# Patient Record
Sex: FEMALE | Race: WHITE | NOT HISPANIC OR LATINO | Employment: OTHER | URBAN - METROPOLITAN AREA
[De-identification: names, ages, dates, MRNs, and addresses within clinical notes are randomized per-mention and may not be internally consistent; named-entity substitution may affect disease eponyms.]

---

## 2018-08-01 ENCOUNTER — HOSPITAL ENCOUNTER (OUTPATIENT)
Dept: LAB | Facility: HOSPITAL | Age: 73
Discharge: HOME | End: 2018-08-01
Attending: FAMILY MEDICINE | Admitting: FAMILY MEDICINE

## 2018-08-01 LAB
ALBUMIN SERPL-MCNC: 3.9 G/DL (ref 3.5–4.8)
ALBUMIN/GLOB SERPL: 1.5 {RATIO} (ref 1–1.7)
ALP SERPL-CCNC: 77 IU/L (ref 32–91)
ALT SERPL-CCNC: 19 IU/L (ref 14–54)
ANION GAP SERPL CALC-SCNC: 11.5 MMOL/L (ref 10–20)
AST SERPL-CCNC: 24 IU/L (ref 15–41)
BASOPHILS # BLD AUTO: 0 10*3/UL (ref 0–0.2)
BASOPHILS NFR BLD AUTO: 1 % (ref 0–2)
BILIRUB SERPL-MCNC: 0.7 MG/DL (ref 0.3–1.2)
BUN SERPL-MCNC: 9 MG/DL (ref 8–20)
BUN/CREAT SERPL: 9 (ref 5.4–26.2)
CALCIUM SERPL-MCNC: 9.4 MG/DL (ref 8.9–10.3)
CHLORIDE SERPL-SCNC: 104 MMOL/L (ref 101–111)
CHOLEST SERPL-MCNC: 193 MG/DL
CHOLEST/HDLC SERPL: 2.1 {RATIO}
CONV CO2: 30 MMOL/L (ref 22–32)
CONV LDL CHOLESTEROL DIRECT: 82 MG/DL (ref 0–100)
CONV TOTAL PROTEIN: 6.5 G/DL (ref 6.1–7.9)
CREAT UR-MCNC: 1 MG/DL (ref 0.4–1)
DIFFERENTIAL METHOD BLD: (no result)
EOSINOPHIL # BLD AUTO: 0.2 10*3/UL (ref 0–0.3)
EOSINOPHIL # BLD AUTO: 4 % (ref 0–3)
ERYTHROCYTE [DISTWIDTH] IN BLOOD BY AUTOMATED COUNT: 13.8 % (ref 11.5–14.5)
GLOBULIN UR ELPH-MCNC: 2.6 G/DL (ref 2.5–3.8)
GLUCOSE SERPL-MCNC: 94 MG/DL (ref 65–99)
HCT VFR BLD AUTO: 45.1 % (ref 35–49)
HDLC SERPL-MCNC: 92 MG/DL
HGB BLD-MCNC: 14.8 G/DL (ref 12–15)
LDLC/HDLC SERPL: 0.9 {RATIO}
LIPID INTERPRETATION: NORMAL
LYMPHOCYTES # BLD AUTO: 1.8 10*3/UL (ref 0.8–4.8)
LYMPHOCYTES NFR BLD AUTO: 36 % (ref 18–42)
MCH RBC QN AUTO: 32 PG (ref 26–32)
MCHC RBC AUTO-ENTMCNC: 32.9 G/DL (ref 32–36)
MCV RBC AUTO: 97.1 FL (ref 80–94)
MONOCYTES # BLD AUTO: 0.4 10*3/UL (ref 0.1–1.3)
MONOCYTES NFR BLD AUTO: 7 % (ref 2–11)
NEUTROPHILS # BLD AUTO: 2.7 10*3/UL (ref 2.3–8.6)
NEUTROPHILS NFR BLD AUTO: 52 % (ref 50–75)
NRBC BLD AUTO-RTO: 0 /100{WBCS}
NRBC/RBC NFR BLD MANUAL: 0 10*3/UL
PLATELET # BLD AUTO: 258 10*3/UL (ref 150–450)
PMV BLD AUTO: 8.4 FL (ref 7.4–10.4)
POTASSIUM SERPL-SCNC: 4.5 MMOL/L (ref 3.6–5.1)
RBC # BLD AUTO: 4.64 10*6/UL (ref 4–5.4)
SODIUM SERPL-SCNC: 141 MMOL/L (ref 136–144)
TRIGL SERPL-MCNC: 48 MG/DL
VLDLC SERPL CALC-MCNC: 19.4 MG/DL
WBC # BLD AUTO: 5.1 10*3/UL (ref 4.5–11.5)

## 2018-08-15 ENCOUNTER — APPOINTMENT (OUTPATIENT)
Dept: WOMENS IMAGING | Facility: HOSPITAL | Age: 73
End: 2018-08-15

## 2018-08-15 PROCEDURE — MDREVIEWSP: Performed by: RADIOLOGY

## 2018-08-15 PROCEDURE — 77067 SCR MAMMO BI INCL CAD: CPT | Performed by: RADIOLOGY

## 2018-08-15 PROCEDURE — 77063 BREAST TOMOSYNTHESIS BI: CPT | Performed by: RADIOLOGY

## 2020-01-13 ENCOUNTER — APPOINTMENT (OUTPATIENT)
Dept: GENERAL RADIOLOGY | Facility: HOSPITAL | Age: 75
End: 2020-01-13

## 2020-01-13 ENCOUNTER — APPOINTMENT (OUTPATIENT)
Dept: CARDIOLOGY | Facility: HOSPITAL | Age: 75
End: 2020-01-13

## 2020-01-13 ENCOUNTER — HOSPITAL ENCOUNTER (OUTPATIENT)
Facility: HOSPITAL | Age: 75
Setting detail: OBSERVATION
Discharge: HOME | End: 2020-01-14
Attending: INTERNAL MEDICINE | Admitting: INTERNAL MEDICINE

## 2020-01-13 PROBLEM — R55 NEAR SYNCOPE: Status: ACTIVE | Noted: 2020-01-13

## 2020-01-13 LAB
ALBUMIN SERPL-MCNC: 4.2 G/DL (ref 3.5–5.2)
ALBUMIN/GLOB SERPL: 1.5 G/DL
ALP SERPL-CCNC: 86 U/L (ref 39–117)
ALT SERPL W P-5'-P-CCNC: 13 U/L (ref 1–33)
ANION GAP SERPL CALCULATED.3IONS-SCNC: 10 MMOL/L (ref 5–15)
AST SERPL-CCNC: 20 U/L (ref 1–32)
BASOPHILS # BLD AUTO: 0 10*3/MM3 (ref 0–0.2)
BASOPHILS NFR BLD AUTO: 0.6 % (ref 0–1.5)
BH CV ECHO MEAS - % IVS THICK: 41.5 %
BH CV ECHO MEAS - % LVPW THICK: 73.3 %
BH CV ECHO MEAS - ACS: 1.6 CM
BH CV ECHO MEAS - AO MAX PG (FULL): 0.43 MMHG
BH CV ECHO MEAS - AO MAX PG: 3.6 MMHG
BH CV ECHO MEAS - AO MEAN PG (FULL): 0.2 MMHG
BH CV ECHO MEAS - AO MEAN PG: 2.1 MMHG
BH CV ECHO MEAS - AO ROOT AREA (BSA CORRECTED): 1.6
BH CV ECHO MEAS - AO ROOT AREA: 5.7 CM^2
BH CV ECHO MEAS - AO ROOT DIAM: 2.7 CM
BH CV ECHO MEAS - AO V2 MAX: 94.7 CM/SEC
BH CV ECHO MEAS - AO V2 MEAN: 69 CM/SEC
BH CV ECHO MEAS - AO V2 VTI: 17 CM
BH CV ECHO MEAS - AVA(I,A): 2.7 CM^2
BH CV ECHO MEAS - AVA(I,D): 2.7 CM^2
BH CV ECHO MEAS - AVA(V,A): 2.3 CM^2
BH CV ECHO MEAS - AVA(V,D): 2.3 CM^2
BH CV ECHO MEAS - BSA(HAYCOCK): 1.7 M^2
BH CV ECHO MEAS - BSA: 1.7 M^2
BH CV ECHO MEAS - BZI_BMI: 21.8 KILOGRAMS/M^2
BH CV ECHO MEAS - BZI_METRIC_HEIGHT: 165.1 CM
BH CV ECHO MEAS - BZI_METRIC_WEIGHT: 59.4 KG
BH CV ECHO MEAS - EDV(CUBED): 51.9 ML
BH CV ECHO MEAS - EDV(MOD-SP2): 37.4 ML
BH CV ECHO MEAS - EDV(MOD-SP4): 42.1 ML
BH CV ECHO MEAS - EDV(TEICH): 59.2 ML
BH CV ECHO MEAS - EF(CUBED): 63.4 %
BH CV ECHO MEAS - EF(MOD-BP): 67 %
BH CV ECHO MEAS - EF(MOD-SP2): 72.7 %
BH CV ECHO MEAS - EF(MOD-SP4): 61.4 %
BH CV ECHO MEAS - EF(TEICH): 55.7 %
BH CV ECHO MEAS - ESV(CUBED): 19 ML
BH CV ECHO MEAS - ESV(MOD-SP2): 10.2 ML
BH CV ECHO MEAS - ESV(MOD-SP4): 16.2 ML
BH CV ECHO MEAS - ESV(TEICH): 26.2 ML
BH CV ECHO MEAS - FS: 28.5 %
BH CV ECHO MEAS - IVS/LVPW: 1
BH CV ECHO MEAS - IVSD: 0.74 CM
BH CV ECHO MEAS - IVSS: 1 CM
BH CV ECHO MEAS - LA DIMENSION(2D): 2.8 CM
BH CV ECHO MEAS - LV DIASTOLIC VOL/BSA (35-75): 25.4 ML/M^2
BH CV ECHO MEAS - LV MASS(C)D: 74.5 GRAMS
BH CV ECHO MEAS - LV MASS(C)DI: 45.1 GRAMS/M^2
BH CV ECHO MEAS - LV MASS(C)S: 87.8 GRAMS
BH CV ECHO MEAS - LV MASS(C)SI: 53.1 GRAMS/M^2
BH CV ECHO MEAS - LV MAX PG: 3.2 MMHG
BH CV ECHO MEAS - LV MEAN PG: 1.9 MMHG
BH CV ECHO MEAS - LV SYSTOLIC VOL/BSA (12-30): 9.8 ML/M^2
BH CV ECHO MEAS - LV V1 MAX: 88.9 CM/SEC
BH CV ECHO MEAS - LV V1 MEAN: 65.8 CM/SEC
BH CV ECHO MEAS - LV V1 VTI: 18.5 CM
BH CV ECHO MEAS - LVIDD: 3.7 CM
BH CV ECHO MEAS - LVIDS: 2.7 CM
BH CV ECHO MEAS - LVOT AREA: 2.4 CM^2
BH CV ECHO MEAS - LVOT DIAM: 1.8 CM
BH CV ECHO MEAS - LVPWD: 0.74 CM
BH CV ECHO MEAS - LVPWS: 1.3 CM
BH CV ECHO MEAS - MV A MAX VEL: 86.9 CM/SEC
BH CV ECHO MEAS - MV DEC SLOPE: 225.3 CM/SEC^2
BH CV ECHO MEAS - MV DEC TIME: 0.22 SEC
BH CV ECHO MEAS - MV E MAX VEL: 50.2 CM/SEC
BH CV ECHO MEAS - MV E/A: 0.58
BH CV ECHO MEAS - MV MAX PG: 2.6 MMHG
BH CV ECHO MEAS - MV MEAN PG: 1.1 MMHG
BH CV ECHO MEAS - MV V2 MAX: 80.4 CM/SEC
BH CV ECHO MEAS - MV V2 MEAN: 51.6 CM/SEC
BH CV ECHO MEAS - MV V2 VTI: 18 CM
BH CV ECHO MEAS - MVA(VTI): 2.5 CM^2
BH CV ECHO MEAS - PA ACC TIME: 0.08 SEC
BH CV ECHO MEAS - PA MAX PG (FULL): 1.2 MMHG
BH CV ECHO MEAS - PA MAX PG: 3 MMHG
BH CV ECHO MEAS - PA MEAN PG (FULL): 0.83 MMHG
BH CV ECHO MEAS - PA MEAN PG: 1.8 MMHG
BH CV ECHO MEAS - PA PR(ACCEL): 41.6 MMHG
BH CV ECHO MEAS - PA V2 MAX: 87 CM/SEC
BH CV ECHO MEAS - PA V2 MEAN: 63.7 CM/SEC
BH CV ECHO MEAS - PA V2 VTI: 18.3 CM
BH CV ECHO MEAS - PULM A REVS DUR: 0.08 SEC
BH CV ECHO MEAS - PULM A REVS VEL: 41.8 CM/SEC
BH CV ECHO MEAS - PULM DIAS VEL: 33 CM/SEC
BH CV ECHO MEAS - PULM S/D: 1.9
BH CV ECHO MEAS - PULM SYS VEL: 62.5 CM/SEC
BH CV ECHO MEAS - RAP SYSTOLE: 3 MMHG
BH CV ECHO MEAS - RV MAX PG: 1.8 MMHG
BH CV ECHO MEAS - RV MEAN PG: 0.92 MMHG
BH CV ECHO MEAS - RV V1 MAX: 66.7 CM/SEC
BH CV ECHO MEAS - RV V1 MEAN: 45 CM/SEC
BH CV ECHO MEAS - RV V1 VTI: 12.9 CM
BH CV ECHO MEAS - RVDD: 2.7 CM
BH CV ECHO MEAS - RVSP: 3.1 MMHG
BH CV ECHO MEAS - SI(AO): 58.9 ML/M^2
BH CV ECHO MEAS - SI(CUBED): 19.9 ML/M^2
BH CV ECHO MEAS - SI(LVOT): 27.2 ML/M^2
BH CV ECHO MEAS - SI(MOD-SP2): 16.4 ML/M^2
BH CV ECHO MEAS - SI(MOD-SP4): 15.6 ML/M^2
BH CV ECHO MEAS - SI(TEICH): 20 ML/M^2
BH CV ECHO MEAS - SV(AO): 97.4 ML
BH CV ECHO MEAS - SV(CUBED): 32.9 ML
BH CV ECHO MEAS - SV(LVOT): 45 ML
BH CV ECHO MEAS - SV(MOD-SP2): 27.2 ML
BH CV ECHO MEAS - SV(MOD-SP4): 25.8 ML
BH CV ECHO MEAS - SV(TEICH): 33 ML
BH CV ECHO MEAS - TR MAX VEL: 12.6 CM/SEC
BILIRUB SERPL-MCNC: 0.4 MG/DL (ref 0.2–1.2)
BUN BLD-MCNC: 10 MG/DL (ref 8–23)
BUN/CREAT SERPL: 12.5 (ref 7–25)
CALCIUM SPEC-SCNC: 9.8 MG/DL (ref 8.6–10.5)
CHLORIDE SERPL-SCNC: 102 MMOL/L (ref 98–107)
CO2 SERPL-SCNC: 29 MMOL/L (ref 22–29)
CREAT BLD-MCNC: 0.8 MG/DL (ref 0.57–1)
D DIMER PPP FEU-MCNC: 0.69 MCGFEU/ML (ref 0.17–0.59)
DEPRECATED RDW RBC AUTO: 49.4 FL (ref 37–54)
EOSINOPHIL # BLD AUTO: 0 10*3/MM3 (ref 0–0.4)
EOSINOPHIL NFR BLD AUTO: 0.3 % (ref 0.3–6.2)
ERYTHROCYTE [DISTWIDTH] IN BLOOD BY AUTOMATED COUNT: 15.2 % (ref 12.3–15.4)
GFR SERPL CREATININE-BSD FRML MDRD: 70 ML/MIN/1.73
GLOBULIN UR ELPH-MCNC: 2.8 GM/DL
GLUCOSE BLD-MCNC: 104 MG/DL (ref 65–99)
HCT VFR BLD AUTO: 42.3 % (ref 34–46.6)
HGB BLD-MCNC: 14.2 G/DL (ref 12–15.9)
LYMPHOCYTES # BLD AUTO: 1.8 10*3/MM3 (ref 0.7–3.1)
LYMPHOCYTES NFR BLD AUTO: 27.2 % (ref 19.6–45.3)
MAGNESIUM SERPL-MCNC: 2.1 MG/DL (ref 1.6–2.4)
MCH RBC QN AUTO: 31.5 PG (ref 26.6–33)
MCHC RBC AUTO-ENTMCNC: 33.6 G/DL (ref 31.5–35.7)
MCV RBC AUTO: 93.7 FL (ref 79–97)
MONOCYTES # BLD AUTO: 0.4 10*3/MM3 (ref 0.1–0.9)
MONOCYTES NFR BLD AUTO: 5.6 % (ref 5–12)
NEUTROPHILS # BLD AUTO: 4.3 10*3/MM3 (ref 1.7–7)
NEUTROPHILS NFR BLD AUTO: 66.3 % (ref 42.7–76)
NRBC BLD AUTO-RTO: 0 /100 WBC (ref 0–0.2)
NT-PROBNP SERPL-MCNC: 650.6 PG/ML (ref 5–900)
PLATELET # BLD AUTO: 277 10*3/MM3 (ref 140–450)
PMV BLD AUTO: 7.3 FL (ref 6–12)
POTASSIUM BLD-SCNC: 4.1 MMOL/L (ref 3.5–5.2)
PROT SERPL-MCNC: 7 G/DL (ref 6–8.5)
RBC # BLD AUTO: 4.51 10*6/MM3 (ref 3.77–5.28)
SODIUM BLD-SCNC: 141 MMOL/L (ref 136–145)
T4 FREE SERPL-MCNC: 1.24 NG/DL (ref 0.93–1.7)
TROPONIN T SERPL-MCNC: <0.01 NG/ML (ref 0–0.03)
TSH SERPL DL<=0.05 MIU/L-ACNC: 2.5 UIU/ML (ref 0.27–4.2)
WBC NRBC COR # BLD: 6.5 10*3/MM3 (ref 3.4–10.8)

## 2020-01-13 PROCEDURE — 93306 TTE W/DOPPLER COMPLETE: CPT | Performed by: INTERNAL MEDICINE

## 2020-01-13 PROCEDURE — 71046 X-RAY EXAM CHEST 2 VIEWS: CPT

## 2020-01-13 PROCEDURE — 84443 ASSAY THYROID STIM HORMONE: CPT | Performed by: INTERNAL MEDICINE

## 2020-01-13 PROCEDURE — 83735 ASSAY OF MAGNESIUM: CPT | Performed by: INTERNAL MEDICINE

## 2020-01-13 PROCEDURE — G0378 HOSPITAL OBSERVATION PER HR: HCPCS

## 2020-01-13 PROCEDURE — 93306 TTE W/DOPPLER COMPLETE: CPT

## 2020-01-13 PROCEDURE — 85379 FIBRIN DEGRADATION QUANT: CPT | Performed by: NURSE PRACTITIONER

## 2020-01-13 PROCEDURE — 85025 COMPLETE CBC W/AUTO DIFF WBC: CPT | Performed by: INTERNAL MEDICINE

## 2020-01-13 PROCEDURE — 93005 ELECTROCARDIOGRAM TRACING: CPT | Performed by: NURSE PRACTITIONER

## 2020-01-13 PROCEDURE — 99214 OFFICE O/P EST MOD 30 MIN: CPT | Performed by: INTERNAL MEDICINE

## 2020-01-13 PROCEDURE — 84439 ASSAY OF FREE THYROXINE: CPT | Performed by: INTERNAL MEDICINE

## 2020-01-13 PROCEDURE — 84484 ASSAY OF TROPONIN QUANT: CPT | Performed by: INTERNAL MEDICINE

## 2020-01-13 PROCEDURE — 84484 ASSAY OF TROPONIN QUANT: CPT | Performed by: NURSE PRACTITIONER

## 2020-01-13 PROCEDURE — 83880 ASSAY OF NATRIURETIC PEPTIDE: CPT | Performed by: INTERNAL MEDICINE

## 2020-01-13 PROCEDURE — G0379 DIRECT REFER HOSPITAL OBSERV: HCPCS

## 2020-01-13 PROCEDURE — 80053 COMPREHEN METABOLIC PANEL: CPT | Performed by: INTERNAL MEDICINE

## 2020-01-13 RX ORDER — SODIUM CHLORIDE 0.9 % (FLUSH) 0.9 %
10 SYRINGE (ML) INJECTION EVERY 12 HOURS SCHEDULED
Status: DISCONTINUED | OUTPATIENT
Start: 2020-01-13 | End: 2020-01-14 | Stop reason: HOSPADM

## 2020-01-13 RX ORDER — ATORVASTATIN CALCIUM 20 MG/1
20 TABLET, FILM COATED ORAL DAILY
Status: DISCONTINUED | OUTPATIENT
Start: 2020-01-14 | End: 2020-01-14 | Stop reason: HOSPADM

## 2020-01-13 RX ORDER — CHLORAL HYDRATE 500 MG
600 CAPSULE ORAL 2 TIMES DAILY WITH MEALS
COMMUNITY

## 2020-01-13 RX ORDER — ONDANSETRON 2 MG/ML
4 INJECTION INTRAMUSCULAR; INTRAVENOUS EVERY 6 HOURS PRN
Status: DISCONTINUED | OUTPATIENT
Start: 2020-01-13 | End: 2020-01-14 | Stop reason: HOSPADM

## 2020-01-13 RX ORDER — ACETAMINOPHEN 160 MG/5ML
650 SOLUTION ORAL EVERY 4 HOURS PRN
Status: DISCONTINUED | OUTPATIENT
Start: 2020-01-13 | End: 2020-01-14 | Stop reason: HOSPADM

## 2020-01-13 RX ORDER — ACETAMINOPHEN 325 MG/1
650 TABLET ORAL EVERY 4 HOURS PRN
Status: DISCONTINUED | OUTPATIENT
Start: 2020-01-13 | End: 2020-01-14 | Stop reason: HOSPADM

## 2020-01-13 RX ORDER — ATORVASTATIN CALCIUM 20 MG/1
20 TABLET, FILM COATED ORAL DAILY
COMMUNITY
End: 2023-03-13

## 2020-01-13 RX ORDER — ACETAMINOPHEN 650 MG/1
650 SUPPOSITORY RECTAL EVERY 4 HOURS PRN
Status: DISCONTINUED | OUTPATIENT
Start: 2020-01-13 | End: 2020-01-14 | Stop reason: HOSPADM

## 2020-01-13 RX ORDER — PANTOPRAZOLE SODIUM 40 MG/1
40 TABLET, DELAYED RELEASE ORAL
Status: DISCONTINUED | OUTPATIENT
Start: 2020-01-14 | End: 2020-01-14 | Stop reason: HOSPADM

## 2020-01-13 RX ORDER — SODIUM CHLORIDE 0.9 % (FLUSH) 0.9 %
10 SYRINGE (ML) INJECTION AS NEEDED
Status: DISCONTINUED | OUTPATIENT
Start: 2020-01-13 | End: 2020-01-14 | Stop reason: HOSPADM

## 2020-01-13 RX ORDER — ONDANSETRON 4 MG/1
4 TABLET, FILM COATED ORAL EVERY 6 HOURS PRN
Status: DISCONTINUED | OUTPATIENT
Start: 2020-01-13 | End: 2020-01-14 | Stop reason: HOSPADM

## 2020-01-13 RX ORDER — NITROGLYCERIN 0.4 MG/1
0.4 TABLET SUBLINGUAL
Status: DISCONTINUED | OUTPATIENT
Start: 2020-01-13 | End: 2020-01-14 | Stop reason: HOSPADM

## 2020-01-13 RX ADMIN — Medication 10 ML: at 20:15

## 2020-01-13 NOTE — H&P
"    Patient Care Team:  Elizabeth Nazario MD as PCP - General (Family Medicine)    Chief complaint episode of dizziness with racing heart beat  Subjective     Patient is a 74 y.o. female presents with sudden onset yesterday of feeling like her heart was racing with shortness of breath and lightheadedness.  She was grocery shopping when it started.  In retrospect, she has experienced brief (seconds) of palpitations intermittently x a few months unrelated to exertion.  She denied any chest pain but described an \"uncomfortable\" feeling in her chest.  Heart rate by her estimate (via stethoscope) was 160.  Her radial pulse was very weak.   She took 1/2 of her 's Ativan, thinking she might be having a panic attack, which lowered her heart rate to 130's.  She fell asleep.  When she woke her heart rate was 70-80's.  She has no known cardiac history and is very active and functional. She drinks one cup of coffee per day and denies any other stimulants.  She does not smoke.    Review of Systems   Constitutional: Negative for activity change, appetite change, chills, diaphoresis, fatigue and fever.   HENT: Negative for facial swelling.    Eyes: Negative for visual disturbance.   Respiratory: Positive for chest tightness and shortness of breath. Negative for cough and choking.    Cardiovascular: Positive for palpitations. Negative for chest pain and leg swelling.   Gastrointestinal: Negative for abdominal pain, diarrhea, nausea and vomiting.   Genitourinary: Negative for urgency.   Musculoskeletal: Negative for myalgias.   Neurological: Negative for tremors, syncope and weakness.   Psychiatric/Behavioral: Negative for behavioral problems and confusion.          History  Past Medical History:   Diagnosis Date   • Hyperlipidemia      History reviewed. No pertinent surgical history.  History reviewed. No pertinent family history.  Social History     Tobacco Use   • Smoking status: Never Smoker   • Smokeless tobacco: Never " Used   Substance Use Topics   • Alcohol use: Yes     Alcohol/week: 7.0 standard drinks     Types: 7 Shots of liquor per week   • Drug use: Never     Medications Prior to Admission   Medication Sig Dispense Refill Last Dose   • atorvastatin (LIPITOR) 20 MG tablet Take 20 mg by mouth Daily.   1/13/2020 at Unknown time   • calcium citrate-vitamin d (CITRACAL) 200-250 MG-UNIT tablet tablet Take 1 tablet by mouth 2 (Two) Times a Day.   1/13/2020 at Unknown time   • Omega-3 Fatty Acids (FISH OIL) 1000 MG capsule capsule Take 600 mg by mouth 2 (Two) Times a Day With Meals.        Allergies:  Patient has no known allergies.    Objective     Vital Signs  Temp:  [97.7 °F (36.5 °C)-98.9 °F (37.2 °C)] 98.9 °F (37.2 °C)  Heart Rate:  [80-86] 80  Resp:  [14] 14  BP: (131-138)/(81-84) 131/81     Physical Exam:      General Appearance:    Alert, cooperative, in no acute distress   Head:    Normocephalic, without obvious abnormality, atraumatic   Eyes:            Lids and lashes normal, conjunctivae and sclerae normal, no   icterus, no pallor, corneas clear, PERRLA   Ears:    Ears appear intact with no abnormalities noted   Throat:   No oral lesions, no thrush, oral mucosa moist   Neck:   No adenopathy, supple, trachea midline, no thyromegaly, no   carotid bruit, no JVD   Lungs:     Clear to auscultation,respirations regular, even and                  unlabored    Heart:    Regular rhythm and normal rate, normal S1 and S2, no            murmur, no gallop, no rub, no click   Chest Wall:    No abnormalities observed   Abdomen:     Normal bowel sounds, no masses, no organomegaly, soft        non-tender, non-distended, no guarding, no rebound                tenderness   Extremities:   Moves all extremities well, no edema, no cyanosis, no             redness   Pulses:   Pulses palpable and equal bilaterally   Skin:   No bleeding, bruising or rash   Lymph nodes:   No palpable adenopathy   Neurologic:   Cranial nerves 2 - 12 grossly  intact, sensation intact, DTR       present and equal bilaterally       Results Review:     Imaging Results (Last 24 Hours)     ** No results found for the last 24 hours. **           Lab Results (last 24 hours)     Procedure Component Value Units Date/Time    BNP [[id number]]  (Normal) Collected:  01/13/20 1433    Specimen:  Blood Updated:  01/13/20 1510     proBNP 650.6 pg/mL     Narrative:       Among patients with dyspnea, NT-proBNP is highly sensitive for the detection of acute congestive heart failure. In addition NT-proBNP of <300 pg/ml effectively rules out acute congestive heart failure with 99% negative predictive value.    Results may be falsely decreased if patient taking Biotin.      T4, Free [[id number]]  (Normal) Collected:  01/13/20 1433    Specimen:  Blood Updated:  01/13/20 1510     Free T4 1.24 ng/dL     Narrative:       Results may be falsely increased if patient taking Biotin.      Troponin [[id number]]  (Normal) Collected:  01/13/20 1433    Specimen:  Blood Updated:  01/13/20 1510     Troponin T <0.010 ng/mL     Narrative:       Troponin T Reference Range:  <= 0.03 ng/mL-   Negative for AMI  >0.03 ng/mL-     Abnormal for myocardial necrosis.  Clinicians would have to utilize clinical acumen, EKG, Troponin and serial changes to determine if it is an Acute Myocardial Infarction or myocardial injury due to an underlying chronic condition.       Results may be falsely decreased if patient taking Biotin.      TSH [[id number]]  (Normal) Collected:  01/13/20 1433    Specimen:  Blood Updated:  01/13/20 1510     TSH 2.500 uIU/mL     Comprehensive Metabolic Panel [[id number]]  (Abnormal) Collected:  01/13/20 1433    Specimen:  Blood Updated:  01/13/20 1507     Glucose 104 mg/dL      BUN 10 mg/dL      Creatinine 0.80 mg/dL      Sodium 141 mmol/L      Potassium 4.1 mmol/L      Chloride 102 mmol/L      CO2 29.0 mmol/L      Calcium 9.8 mg/dL      Total Protein 7.0 g/dL      Albumin 4.20 g/dL      ALT (SGPT) 13  U/L      AST (SGOT) 20 U/L      Alkaline Phosphatase 86 U/L      Total Bilirubin 0.4 mg/dL      eGFR Non African Amer 70 mL/min/1.73      Globulin 2.8 gm/dL      A/G Ratio 1.5 g/dL      BUN/Creatinine Ratio 12.5     Anion Gap 10.0 mmol/L     Narrative:       GFR Normal >60  Chronic Kidney Disease <60  Kidney Failure <15      Magnesium [[id number]]  (Normal) Collected:  01/13/20 1433    Specimen:  Blood Updated:  01/13/20 1507     Magnesium 2.1 mg/dL     CBC Auto Differential [[id number]]  (Normal) Collected:  01/13/20 1433    Specimen:  Blood Updated:  01/13/20 1444     WBC 6.50 10*3/mm3      RBC 4.51 10*6/mm3      Hemoglobin 14.2 g/dL      Hematocrit 42.3 %      MCV 93.7 fL      MCH 31.5 pg      MCHC 33.6 g/dL      RDW 15.2 %      RDW-SD 49.4 fl      MPV 7.3 fL      Platelets 277 10*3/mm3      Neutrophil % 66.3 %      Lymphocyte % 27.2 %      Monocyte % 5.6 %      Eosinophil % 0.3 %      Basophil % 0.6 %      Neutrophils, Absolute 4.30 10*3/mm3      Lymphocytes, Absolute 1.80 10*3/mm3      Monocytes, Absolute 0.40 10*3/mm3      Eosinophils, Absolute 0.00 10*3/mm3      Basophils, Absolute 0.00 10*3/mm3      nRBC 0.0 /100 WBC            I reviewed the patient's new clinical results.    Assessment/Plan       Near syncope  Tachycardia  Palpitations    My concern is for arrhythmia - afib/flutter, SVT, etc.  She has been admitted for monitoring, echo, cardiology consult.        I discussed the patients findings and my recommendations with patient.     Tita Hauser MD  01/13/20  4:58 PM

## 2020-01-13 NOTE — CONSULTS
CARDIOLOGY CONSULT NOTE      Referring Provider: Dr. Hauser    Reason for Consultation: Palpitations    Attending: Tita Hauser MD    Chief complaint    Palpitations and dyspnea    Subjective .     History of present illness:  Mary Kay Montero is a 74 y.o. female who presents with reported palpitations and dyspnea.    The patient reports that yesterday she was at [facility] doing some grocery shopping and she had a sudden onset of shortness of breath and palpitations with feelings like her heart was racing.  She went home.  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  She said it was very rapid and she counted it was near 160 beats a minute.    She denies associated chest pain but does report the shortness of breath and feeling lightheaded and dizzy.  She has never had any problems with arrhythmias in the past.  She was wondering if she may be having a panic attack her  had Ativan at home and he gave her half of 1 of his Ativan's.  She said within about 20 to 30 minutes her heart rate had come down into the 120s to 130s and she felt a little bit better.  She continued to lay down and fell asleep and when she woke back up her heart rate was regular in the 80s.    She denies any recent illnesses or changes in her functional status.  She has had no chest pain, edema    She reports history of treated dyslipidemia but denies high blood pressure.  She denies diabetes, known CAD, previous arrhythmias or congestive heart failure.    Patient was a direct admit from Dr. Choudhury's office.  She saw Dr. Hauser in the office today who arranged for her admission.  Unfortunately I do not have any results of her EKGs or labs back at this time    Review of Systems   Cardiovascular: Positive for dyspnea on exertion, irregular heartbeat and palpitations. Negative for chest pain.   Respiratory: Positive for shortness of  "breath.    All other systems reviewed and are negative.      History  Past Medical History:   Diagnosis Date   • Hyperlipidemia        History reviewed. No pertinent surgical history.    History reviewed. No pertinent family history.    Social History     Tobacco Use   • Smoking status: Never Smoker   • Smokeless tobacco: Never Used   Substance Use Topics   • Alcohol use: Yes     Alcohol/week: 7.0 standard drinks     Types: 7 Shots of liquor per week   • Drug use: Never        Medications Prior to Admission   Medication Sig Dispense Refill Last Dose   • atorvastatin (LIPITOR) 20 MG tablet Take 20 mg by mouth Daily.   1/13/2020 at Unknown time   • calcium citrate-vitamin d (CITRACAL) 200-250 MG-UNIT tablet tablet Take 1 tablet by mouth 2 (Two) Times a Day.   1/13/2020 at Unknown time   • Omega-3 Fatty Acids (FISH OIL) 1000 MG capsule capsule Take 600 mg by mouth 2 (Two) Times a Day With Meals.            Patient has no known allergies.    Scheduled Meds:  [START ON 1/14/2020] atorvastatin 20 mg Oral Daily   [START ON 1/14/2020] pantoprazole 40 mg Oral Q AM   sodium chloride 10 mL Intravenous Q12H     Continuous Infusions:   PRN Meds:.•  acetaminophen **OR** acetaminophen **OR** acetaminophen  •  nitroglycerin  •  ondansetron **OR** ondansetron  •  sodium chloride    Objective     VITAL SIGNS  Vitals:    01/13/20 1314 01/13/20 1318 01/13/20 1512   BP:  131/81 138/84   BP Location:  Left arm Left arm   Patient Position:  Lying Lying   Pulse:  86 80   Resp:  14 14   Temp:  97.7 °F (36.5 °C) 98.9 °F (37.2 °C)   TempSrc:  Oral Oral   SpO2:  94% 99%   Weight: 59.8 kg (131 lb 13.4 oz)     Height: 165.1 cm (65\")         Flowsheet Rows      First Filed Value   Admission Height  165.1 cm (65\") Documented at 01/13/2020 1314   Admission Weight  59.8 kg (131 lb 13.4 oz) Documented at 01/13/2020 1314           TELEMETRY: SR    Physical Exam:  Physical Exam   Constitutional: She is oriented to person, place, and time. She appears " well-developed and well-nourished. No distress.   HENT:   Head: Normocephalic and atraumatic.   Eyes: Pupils are equal, round, and reactive to light.   Neck: Normal range of motion. Neck supple. No JVD present.   Cardiovascular: Normal rate, regular rhythm, S1 normal, S2 normal, normal heart sounds and intact distal pulses.   No murmur heard.  Pulmonary/Chest: Effort normal and breath sounds normal.   Abdominal: Soft. Normal appearance. She exhibits no distension. There is no tenderness.   Musculoskeletal: Normal range of motion. She exhibits no edema.   Neurological: She is alert and oriented to person, place, and time.   Skin: Skin is warm and dry.   Psychiatric: She has a normal mood and affect.        Results Review:   I reviewed the patient's new clinical results.    CBC    Results from last 7 days   Lab Units 01/13/20  1433   WBC 10*3/mm3 6.50   HEMOGLOBIN g/dL 14.2   PLATELETS 10*3/mm3 277     BMP Results from last 7 days   Lab Units 01/13/20  1433   SODIUM mmol/L 141   POTASSIUM mmol/L 4.1   CHLORIDE mmol/L 102   CO2 mmol/L 29.0   BUN mg/dL 10   CREATININE mg/dL 0.80   GLUCOSE mg/dL 104*   MAGNESIUM mg/dL 2.1     Cr Clearance Estimated Creatinine Clearance: 58.2 mL/min (by C-G formula based on SCr of 0.8 mg/dL).  Coag     HbA1C No results found for: HGBA1C  Blood Glucose No results found for: POCGLU  Infection     CMP Results from last 7 days   Lab Units 01/13/20  1433   SODIUM mmol/L 141   POTASSIUM mmol/L 4.1   CHLORIDE mmol/L 102   CO2 mmol/L 29.0   BUN mg/dL 10   CREATININE mg/dL 0.80   GLUCOSE mg/dL 104*   ALBUMIN g/dL 4.20   BILIRUBIN mg/dL 0.4   ALK PHOS U/L 86   AST (SGOT) U/L 20   ALT (SGPT) U/L 13     ABG      UA      KIM  No results found for: POCMETH, POCAMPHET, POCBARBITUR, POCBENZO, POCCOCAINE, POCOPIATES, POCOXYCODO, POCPHENCYC, POCPROPOXY, POCTHC, POCTRICYC  Lysis Labs Results from last 7 days   Lab Units 01/13/20  1433   HEMOGLOBIN g/dL 14.2   PLATELETS 10*3/mm3 277   CREATININE mg/dL 0.80      Radiology(recent) No radiology results for the last day    Results from last 7 days   Lab Units 01/13/20  1433   TROPONIN T ng/mL <0.010       Imaging Results (Last 24 Hours)     ** No results found for the last 24 hours. **           ECHOCARDIOGRAM:  Pending    STRESS MYOVIEW:    CARDIAC CATHETERIZATION:    OTHER:         Assessment/Plan     Reported Tachycardia with HR to 160 per pt.   Palpitaitons  Sudden onset Dyspnea associated with above  Dyslipidemia      Plan:  EKG  Echocardiogram to assess LV function/valvular status  Monitor telemetry  Check D-Dimer  Serial Troponins to rule out MI  TSH, CMP, Mg, pending  Stress myoview in am to rule out ischemic burden if troponins negative.   Consider outpatient event monitor.     Additional recommendations per Dr. Bernal    I discussed the patients findings and my recommendations with patient and RN    Electronically signed by OLEKSANDR Carlson, 01/13/20, 3:56 PM.      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  I have examined this patient personally and reviewed clinical data and events in detail.  I have also reviewed and concur with KENNY Angelo clinical exam description as well as assessment and plan as detailed above.    At time of my exam, Ms. tineo resting comfortably on room air with sinus rhythm and stable vital signs.  Neck is supple without JVD or bruit her chest grossly symmetric and clear to auscultation with normal heart tones and no murmurs clicks or rub.  Abdomen is soft nontender without organomegaly.  Extremities show adequate ROM without peripheral CC or E and distal pulses are 2+ and symmetric.  Neurologic exam is nonfocal.    To my surprise, no ECG has been performed; however, I am getting 1 now for review.  I have reviewed her  echocardiogram and chest x-ray both which are entirely normal.  Likewise, her serology is remarkably normal with no identifiable/reversible causes for reported tachycardia of indeterminate rhythm.    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  No evidence of organic heart disease by physical exam, echocardiogram, chest x-ray or EKG-just retrieved!  Showing sinus rhythm with nonspecific repolarization changes, possible left atrial abnormality (doubt), and no evidence of cardiac conduction system disease.    -We will evaluate by stress Myoview in a.m. for evidence of ischemic coronary disease as well as chronotropic incompetence or exercise-induced dysrhythmia.  -If stress Myoview exam is also unremarkable, would likely discharge to home with 30-day event monitor and outpatient follow-up.  If no further events and that period of time, and implantable loop recorder may be considered.

## 2020-01-13 NOTE — PROGRESS NOTES
Discharge Planning Assessment  [facility]     Patient Name: Mary Kay Montero  MRN: [id number]  Today's Date: 1/13/2020    Admit Date: 1/13/2020    Discharge Needs Assessment     Row Name 01/13/20 1504       Living Environment    Lives With  spouse    Current Living Arrangements  home/apartment/condo    Primary Care Provided by  self    Provides Primary Care For  no one    Family Caregiver if Needed  spouse    Family Caregiver Names  Spouse- Milan    Quality of Family Relationships  supportive    Able to Return to Prior Arrangements  yes       Resource/Environmental Concerns    Resource/Environmental Concerns  none    Transportation Concerns  car, none       Transition Planning    Patient/Family Anticipates Transition to  home with family    Patient/Family Anticipated Services at Transition  none    Transportation Anticipated  family or friend will provide       Discharge Needs Assessment    Readmission Within the Last 30 Days  no previous admission in last 30 days    Equipment Currently Used at Home  none    Anticipated Changes Related to Illness  none    Equipment Needed After Discharge  none    Provided post acute provider list?  Refused    Discharge Coordination/Progress  PCP Aravind, states no trouble affording medications.        Discharge Plan     Row Name 01/13/20 1505       Plan    Plan  Anticipate routine home, denies needs at this time.    Patient/Family in Agreement with Plan  yes    Plan Comments  Patient off the floor at echo, discussed dc plan with her spouse, Milan who denied needs at this time. DC Barriers: Cardiology consulted.     Demographic Summary     Row Name 01/13/20 1504       General Information    Admission Type  observation    Reason for Consult  discharge planning    Preferred Language  English     Used During This Interaction  no       Contact Information    Permission Granted to Share Info With       Iveth Ramirez RN       Office Phone:  [phone]  [facility] Cell: [phone]

## 2020-01-14 ENCOUNTER — APPOINTMENT (OUTPATIENT)
Dept: NUCLEAR MEDICINE | Facility: HOSPITAL | Age: 75
End: 2020-01-14

## 2020-01-14 ENCOUNTER — APPOINTMENT (OUTPATIENT)
Dept: RESPIRATORY THERAPY | Facility: HOSPITAL | Age: 75
End: 2020-01-14

## 2020-01-14 VITALS
RESPIRATION RATE: 16 BRPM | DIASTOLIC BLOOD PRESSURE: 70 MMHG | BODY MASS INDEX: 22.7 KG/M2 | WEIGHT: 136.24 LBS | OXYGEN SATURATION: 96 % | SYSTOLIC BLOOD PRESSURE: 106 MMHG | TEMPERATURE: 98.5 F | HEIGHT: 65 IN | HEART RATE: 90 BPM

## 2020-01-14 LAB
ALBUMIN SERPL-MCNC: 4 G/DL (ref 3.5–5.2)
ALBUMIN/GLOB SERPL: 1.5 G/DL
ALP SERPL-CCNC: 79 U/L (ref 39–117)
ALT SERPL W P-5'-P-CCNC: 12 U/L (ref 1–33)
ANION GAP SERPL CALCULATED.3IONS-SCNC: 13 MMOL/L (ref 5–15)
AST SERPL-CCNC: 19 U/L (ref 1–32)
BILIRUB SERPL-MCNC: 0.5 MG/DL (ref 0.2–1.2)
BUN BLD-MCNC: 12 MG/DL (ref 8–23)
BUN/CREAT SERPL: 15.6 (ref 7–25)
CALCIUM SPEC-SCNC: 9.1 MG/DL (ref 8.6–10.5)
CHLORIDE SERPL-SCNC: 104 MMOL/L (ref 98–107)
CHOLEST SERPL-MCNC: 167 MG/DL (ref 0–200)
CO2 SERPL-SCNC: 24 MMOL/L (ref 22–29)
CREAT BLD-MCNC: 0.77 MG/DL (ref 0.57–1)
GFR SERPL CREATININE-BSD FRML MDRD: 73 ML/MIN/1.73
GLOBULIN UR ELPH-MCNC: 2.7 GM/DL
GLUCOSE BLD-MCNC: 91 MG/DL (ref 65–99)
HDLC SERPL-MCNC: 86 MG/DL (ref 40–60)
LDLC SERPL CALC-MCNC: 70 MG/DL (ref 0–100)
LDLC/HDLC SERPL: 0.82 {RATIO}
POTASSIUM BLD-SCNC: 3.8 MMOL/L (ref 3.5–5.2)
PROT SERPL-MCNC: 6.7 G/DL (ref 6–8.5)
SODIUM BLD-SCNC: 141 MMOL/L (ref 136–145)
TRIGL SERPL-MCNC: 54 MG/DL (ref 0–150)
TROPONIN T SERPL-MCNC: <0.01 NG/ML (ref 0–0.03)
VLDLC SERPL-MCNC: 10.8 MG/DL

## 2020-01-14 PROCEDURE — G0378 HOSPITAL OBSERVATION PER HR: HCPCS

## 2020-01-14 PROCEDURE — 80061 LIPID PANEL: CPT | Performed by: INTERNAL MEDICINE

## 2020-01-14 PROCEDURE — 84484 ASSAY OF TROPONIN QUANT: CPT | Performed by: NURSE PRACTITIONER

## 2020-01-14 PROCEDURE — 99213 OFFICE O/P EST LOW 20 MIN: CPT | Performed by: INTERNAL MEDICINE

## 2020-01-14 PROCEDURE — 84484 ASSAY OF TROPONIN QUANT: CPT | Performed by: INTERNAL MEDICINE

## 2020-01-14 PROCEDURE — 0 TECHNETIUM SESTAMIBI: Performed by: INTERNAL MEDICINE

## 2020-01-14 PROCEDURE — 93016 CV STRESS TEST SUPVJ ONLY: CPT | Performed by: INTERNAL MEDICINE

## 2020-01-14 PROCEDURE — 93270 REMOTE 30 DAY ECG REV/REPORT: CPT

## 2020-01-14 PROCEDURE — 80053 COMPREHEN METABOLIC PANEL: CPT | Performed by: INTERNAL MEDICINE

## 2020-01-14 PROCEDURE — 78452 HT MUSCLE IMAGE SPECT MULT: CPT

## 2020-01-14 PROCEDURE — A9500 TC99M SESTAMIBI: HCPCS | Performed by: INTERNAL MEDICINE

## 2020-01-14 PROCEDURE — 93017 CV STRESS TEST TRACING ONLY: CPT

## 2020-01-14 RX ORDER — METOPROLOL SUCCINATE 25 MG/1
TABLET, EXTENDED RELEASE ORAL
Qty: 30 TABLET | Refills: 1 | Status: SHIPPED | OUTPATIENT
Start: 2020-01-14 | End: 2020-10-12 | Stop reason: SDUPTHER

## 2020-01-14 RX ADMIN — PANTOPRAZOLE SODIUM 40 MG: 40 TABLET, DELAYED RELEASE ORAL at 05:53

## 2020-01-14 RX ADMIN — Medication 10 ML: at 08:35

## 2020-01-14 RX ADMIN — TECHNETIUM TC 99M SESTAMIBI 1 DOSE: 1 INJECTION INTRAVENOUS at 09:15

## 2020-01-14 RX ADMIN — TECHNETIUM TC 99M SESTAMIBI 1 DOSE: 1 INJECTION INTRAVENOUS at 10:40

## 2020-01-14 NOTE — PROGRESS NOTES
CARDIOLOGY FOLLOW-UP PROGRESS NOTE      Reason for follow-up:    Palpitations  Tachycardia     Attending: Tita Hauser MD      SUBJECTIVE:    No complaint of palpitations, tachycardia, chest pain, dizziness, or near syncope, though admitted to being a little lightheaded when she got up to brush her teeth earlier this morning.     Review of Systems   General: denies fever, chills, anorexia, weight loss  Eyes: denies blurring, diplopia  Ear/Nose/Throat: denies ear pain, nosebleeds, hoarseness  Cardiovascular: See HPI  Respiratory: denies excessive sputum, hemoptysis, wheezing  Gastrointestinal: denies nausea, vomiting, change in bowel habits, abdominal pain  Genitourinary: denies dysuria and hematuria  Musculoskeletal: denies back pain, joint pain, joint swelling, muscle cramps, weakness  Skin: denies rashes, itching, suspicious lesions  Neurologic: denies focal neuro deficits  Psychiatric: denies depression, anxiety  Endocrine: denies cold intolerance, heat intolerance  Hematologic/Lymphatic: denies abnormal bruising, bleeding  Allergic/Immunologic: denies urticaria or persistent infections      Allergies: Patient has no known allergies.    Scheduled Meds:  atorvastatin 20 mg Oral Daily   pantoprazole 40 mg Oral Q AM   sodium chloride 10 mL Intravenous Q12H       Continuous Infusions:     PRN Meds:.•  acetaminophen **OR** acetaminophen **OR** acetaminophen  •  nitroglycerin  •  ondansetron **OR** ondansetron  •  sodium chloride    Objective     Vital Signs  Vitals:    01/13/20 1900 01/13/20 2300 01/14/20 0334 01/14/20 0742   BP: 121/81 114/70  128/86   BP Location: Right arm Right arm  Right arm   Patient Position: Sitting Lying  Lying   Pulse: 80 76  85   Resp: 16 14  16   Temp: 98.8 °F (37.1 °C) 97.5 °F (36.4 °C)  99 °F (37.2 °C)   TempSrc: Oral Oral  Oral   SpO2: 97% 97%  98%   Weight:   61.8 kg (136 lb 3.9 oz)    Height:         Wt Readings from Last 1 Encounters:   01/14/20 61.8 kg (136 lb 3.9 oz)       Physical  Exam:     General Appearance:    Alert, oriented, no acute distress   Neck:  Supple without JVD or bruit   Lungs:    Grossly clear with adequate airflow bilaterally    Heart:    Regular rate and rhythm, normal S1 and S2, no            murmur, no gallop, no rub, no click   Chest Wall/Thorax:    No abnormalities observed   Abdomen:     Normal bowel sounds, no masses, no organomegaly, soft        non-tender, non-distended, no guarding, no rebound                tenderness   Extremities:  Range of motion adequate, no edema, no cyanosis, no             redness   Pulses:   Pulses palpable and equal bilaterally   Skin:   No bleeding, bruising or rash   Neurologic:  No focal deficits               Results Review:     CBC    Results from last 7 days   Lab Units 01/13/20  1433   WBC 10*3/mm3 6.50   HEMOGLOBIN g/dL 14.2   PLATELETS 10*3/mm3 277     BMP   Results from last 7 days   Lab Units 01/14/20  0407 01/13/20  1433   SODIUM mmol/L 141 141   POTASSIUM mmol/L 3.8 4.1   CHLORIDE mmol/L 104 102   CO2 mmol/L 24.0 29.0   BUN mg/dL 12 10   CREATININE mg/dL 0.77 0.80   GLUCOSE mg/dL 91 104*   MAGNESIUM mg/dL  --  2.1     CMP   Results from last 7 days   Lab Units 01/14/20  0407 01/13/20  1433   SODIUM mmol/L 141 141   POTASSIUM mmol/L 3.8 4.1   CHLORIDE mmol/L 104 102   CO2 mmol/L 24.0 29.0   BUN mg/dL 12 10   CREATININE mg/dL 0.77 0.80   GLUCOSE mg/dL 91 104*   ALBUMIN g/dL 4.00 4.20   BILIRUBIN mg/dL 0.5 0.4   ALK PHOS U/L 79 86   AST (SGOT) U/L 19 20   ALT (SGPT) U/L 12 13     Radiology(recent) Xr Chest 2 View    Result Date: 1/13/2020  Negative chest radiograph.   Electronically Signed By-Eder Hull On:1/13/2020 7:58 PM This report was finalized on [id number] by  Eder Hull, .      Cardiac Studies:  Echocardiogram within normal limits  Stress Myoview pending    Medication Review:   Scheduled Meds:  atorvastatin 20 mg Oral Daily   pantoprazole 40 mg Oral Q AM   sodium chloride 10 mL Intravenous Q12H      Continuous Infusions:   PRN Meds:.•  acetaminophen **OR** acetaminophen **OR** acetaminophen  •  nitroglycerin  •  ondansetron **OR** ondansetron  •  sodium chloride    Assessment:  74-year-old WF presents with palpitations and tachycardia; remains hemodynamically stable in sinus rhythm.    Plan:  Scheduled for stress Myoview exam this a.m. to assess for ischemic coronary disease as well as chronotropic response to exercise.  If Myoview negative, would place 30-day event monitor and discharged home later today with low-dose metoprolol tartrate 25 mg p.o. every 4 hours PRN heart rate sustained greater than 120 bpm.  I would be happy to see Ms. clear for follow-up and 5 or 6 weeks or sooner if needed.    I discussed the patients findings and my recommendations with patient and bedside nurse for coordination of care.      CRISELDA Bernal MD  01/14/20  9:43 AM      This report was generated using the Dragon voice recognition system.

## 2020-01-14 NOTE — PROGRESS NOTES
LOS: 0 days   Patient Care Team:  Elizabeth Nazario MD as PCP - General (Family Medicine)    Subjective     Interval History:    Patient Complaints: no chest pain or palpitations; feels fine    History taken from: patient    Review of Systems   Constitutional: Negative for activity change, appetite change, diaphoresis and fatigue.   HENT: Negative for facial swelling.    Eyes: Negative for visual disturbance.   Respiratory: Negative for cough, chest tightness, shortness of breath, wheezing and stridor.    Cardiovascular: Negative for chest pain, palpitations and leg swelling.   Gastrointestinal: Negative for abdominal pain, diarrhea, nausea and vomiting.   Endocrine: Negative for polyuria.   Genitourinary: Negative for enuresis and urgency.   Musculoskeletal: Negative for myalgias.   Skin: Negative for rash.   Neurological: Negative for light-headedness.   Psychiatric/Behavioral: Negative for agitation.           Objective     Vital Signs  Temp:  [97.5 °F (36.4 °C)-99 °F (37.2 °C)] 99 °F (37.2 °C)  Heart Rate:  [76-86] 85  Resp:  [14-16] 16  BP: (114-138)/(70-86) 128/86    Physical Exam:     General Appearance:    Alert, cooperative, in no acute distress,   Head:    Normocephalic, without obvious abnormality, atraumatic   Eyes:            Lids and lashes normal, conjunctivae and sclerae normal, no   icterus, no pallor, corneas clear, PERRLA   Ears:    Ears appear intact with no abnormalities noted   Throat:   No oral lesions, no thrush, oral mucosa moist   Neck:   No adenopathy, supple, trachea midline, no thyromegaly, no   carotid bruit, no JVD   Lungs:     Clear to auscultation,respirations regular, even and                  unlabored    Heart:    Regular rhythm and normal rate, normal S1 and S2, no            murmur, no gallop, no rub, no click   Chest Wall:    No abnormalities observed   Abdomen:     Normal bowel sounds, no masses, no organomegaly, soft        Non-tender non-distended, no guarding,      Extremities:   Moves all extremities well, no edema, no cyanosis, no             Redness   Pulses:   Pulses palpable and equal bilaterally   Skin:   No bleeding, bruising or rash   Lymph nodes:   No palpable adenopathy   Neurologic:   Cranial nerves 2 - 12 grossly intact, sensation intact, DTR       present and equal bilaterally        Results Review:    Lab Results (last 24 hours)     Procedure Component Value Units Date/Time    Troponin [[id number]] Collected:  01/14/20 0713    Specimen:  Blood Updated:  01/14/20 0739    Comprehensive Metabolic Panel [[id number]] Collected:  01/14/20 0407    Specimen:  Blood Updated:  01/14/20 0720     Glucose 91 mg/dL      BUN 12 mg/dL      Creatinine 0.77 mg/dL      Sodium 141 mmol/L      Potassium 3.8 mmol/L      Chloride 104 mmol/L      CO2 24.0 mmol/L      Calcium 9.1 mg/dL      Total Protein 6.7 g/dL      Albumin 4.00 g/dL      ALT (SGPT) 12 U/L      AST (SGOT) 19 U/L      Alkaline Phosphatase 79 U/L      Total Bilirubin 0.5 mg/dL      eGFR Non African Amer 73 mL/min/1.73      Globulin 2.7 gm/dL      A/G Ratio 1.5 g/dL      BUN/Creatinine Ratio 15.6     Anion Gap 13.0 mmol/L     Narrative:       GFR Normal >60  Chronic Kidney Disease <60  Kidney Failure <15      Troponin [[id number]]  (Normal) Collected:  01/14/20 0407    Specimen:  Blood Updated:  01/14/20 0617     Troponin T <0.010 ng/mL     Narrative:       Troponin T Reference Range:  <= 0.03 ng/mL-   Negative for AMI  >0.03 ng/mL-     Abnormal for myocardial necrosis.  Clinicians would have to utilize clinical acumen, EKG, Troponin and serial changes to determine if it is an Acute Myocardial Infarction or myocardial injury due to an underlying chronic condition.       Results may be falsely decreased if patient taking Biotin.      Lipid Panel [[id number]]  (Abnormal) Collected:  01/14/20 0407    Specimen:  Blood Updated:  01/14/20 0617     Total Cholesterol 167 mg/dL      Triglycerides 54 mg/dL      HDL Cholesterol 86  mg/dL      LDL Cholesterol  70 mg/dL      VLDL Cholesterol 10.8 mg/dL      LDL/HDL Ratio 0.82    Narrative:       Cholesterol Reference Ranges  (U.S. Department of Health and Human Services ATP III Classifications)    Desirable          <200 mg/dL  Borderline High    200-239 mg/dL  High Risk          >240 mg/dL      Triglyceride Reference Ranges  (U.S. Department of Health and Human Services ATP III Classifications)    Normal           <150 mg/dL  Borderline High  150-199 mg/dL  High             200-499 mg/dL  Very High        >500 mg/dL    HDL Reference Ranges  (U.S. Department of Health and Human Services ATP III Classifcations)    Low     <40 mg/dl (major risk factor for CHD)  High    >60 mg/dl ('negative' risk factor for CHD)        LDL Reference Ranges  (U.S. Department of Health and Human Services ATP III Classifcations)    Optimal          <100 mg/dL  Near Optimal     100-129 mg/dL  Borderline High  130-159 mg/dL  High             160-189 mg/dL  Very High        >189 mg/dL    Troponin [[id number]]  (Normal) Collected:  01/13/20 2302    Specimen:  Blood Updated:  01/14/20 0020     Troponin T <0.010 ng/mL     Narrative:       Troponin T Reference Range:  <= 0.03 ng/mL-   Negative for AMI  >0.03 ng/mL-     Abnormal for myocardial necrosis.  Clinicians would have to utilize clinical acumen, EKG, Troponin and serial changes to determine if it is an Acute Myocardial Infarction or myocardial injury due to an underlying chronic condition.       Results may be falsely decreased if patient taking Biotin.      D-dimer, Quantitative [[id number]]  (Abnormal) Collected:  01/13/20 1830    Specimen:  Blood Updated:  01/13/20 1930     D-Dimer, Quantitative 0.69 MCGFEU/mL     Narrative:       Reference Range  --------------------------------------------------------------------     < 0.50   Negative Predictive Value  0.50-0.59   Indeterminate    >= 0.60   Probable VTE             A very low percentage of patients with DVT may  yield D-Dimer results   below the cut-off of 0.50 MCGFEU/mL.  This is known to be more   prevalent in patients with distal DVT.             Results of this test should always be interpreted in conjunction with   the patient's medical history, clinical presentation and other   findings.  Clinical diagnosis should not be based on the result of   IN[facility]OVANCE D-Dimer alone.    BNP [[id number]]  (Normal) Collected:  01/13/20 1433    Specimen:  Blood Updated:  01/13/20 1510     proBNP 650.6 pg/mL     Narrative:       Among patients with dyspnea, NT-proBNP is highly sensitive for the detection of acute congestive heart failure. In addition NT-proBNP of <300 pg/ml effectively rules out acute congestive heart failure with 99% negative predictive value.    Results may be falsely decreased if patient taking Biotin.      T4, Free [[id number]]  (Normal) Collected:  01/13/20 1433    Specimen:  Blood Updated:  01/13/20 1510     Free T4 1.24 ng/dL     Narrative:       Results may be falsely increased if patient taking Biotin.      Troponin [[id number]]  (Normal) Collected:  01/13/20 1433    Specimen:  Blood Updated:  01/13/20 1510     Troponin T <0.010 ng/mL     Narrative:       Troponin T Reference Range:  <= 0.03 ng/mL-   Negative for AMI  >0.03 ng/mL-     Abnormal for myocardial necrosis.  Clinicians would have to utilize clinical acumen, EKG, Troponin and serial changes to determine if it is an Acute Myocardial Infarction or myocardial injury due to an underlying chronic condition.       Results may be falsely decreased if patient taking Biotin.      TSH [[id number]]  (Normal) Collected:  01/13/20 1433    Specimen:  Blood Updated:  01/13/20 1510     TSH 2.500 uIU/mL     Comprehensive Metabolic Panel [[id number]]  (Abnormal) Collected:  01/13/20 1433    Specimen:  Blood Updated:  01/13/20 1507     Glucose 104 mg/dL      BUN 10 mg/dL      Creatinine 0.80 mg/dL      Sodium 141 mmol/L      Potassium 4.1 mmol/L      Chloride 102 mmol/L       CO2 29.0 mmol/L      Calcium 9.8 mg/dL      Total Protein 7.0 g/dL      Albumin 4.20 g/dL      ALT (SGPT) 13 U/L      AST (SGOT) 20 U/L      Alkaline Phosphatase 86 U/L      Total Bilirubin 0.4 mg/dL      eGFR Non African Amer 70 mL/min/1.73      Globulin 2.8 gm/dL      A/G Ratio 1.5 g/dL      BUN/Creatinine Ratio 12.5     Anion Gap 10.0 mmol/L     Narrative:       GFR Normal >60  Chronic Kidney Disease <60  Kidney Failure <15      Magnesium [[id number]]  (Normal) Collected:  01/13/20 1433    Specimen:  Blood Updated:  01/13/20 1507     Magnesium 2.1 mg/dL     CBC Auto Differential [[id number]]  (Normal) Collected:  01/13/20 1433    Specimen:  Blood Updated:  01/13/20 1444     WBC 6.50 10*3/mm3      RBC 4.51 10*6/mm3      Hemoglobin 14.2 g/dL      Hematocrit 42.3 %      MCV 93.7 fL      MCH 31.5 pg      MCHC 33.6 g/dL      RDW 15.2 %      RDW-SD 49.4 fl      MPV 7.3 fL      Platelets 277 10*3/mm3      Neutrophil % 66.3 %      Lymphocyte % 27.2 %      Monocyte % 5.6 %      Eosinophil % 0.3 %      Basophil % 0.6 %      Neutrophils, Absolute 4.30 10*3/mm3      Lymphocytes, Absolute 1.80 10*3/mm3      Monocytes, Absolute 0.40 10*3/mm3      Eosinophils, Absolute 0.00 10*3/mm3      Basophils, Absolute 0.00 10*3/mm3      nRBC 0.0 /100 WBC            Imaging Results (Last 24 Hours)     Procedure Component Value Units Date/Time    XR Chest 2 View [[id number]] Collected:  01/13/20 1955     Updated:  01/13/20 2000    Narrative:       XR CHEST 2 VW-     Date of Exam: 1/13/2020 7:06 PM     Indication: Tachycardia and dyspnea.     Comparison: None available.     Technique: Two views of the chest were obtained.     FINDINGS: Lungs are normally expanded.  Heart size is within normal  limits.  No significant pneumothorax, pleural effusion or focal  pulmonary parenchymal opacity. Bones and soft tissues are within normal  limits.       Impression:       Negative chest radiograph.        Electronically Signed By-Eder Hull  On:1/13/2020 7:58 PM  This report was finalized on [id number] by  Eder Hull, .               I reviewed the patient's new clinical results.    Medication Review:   Scheduled Meds:  atorvastatin 20 mg Oral Daily   pantoprazole 40 mg Oral Q AM   sodium chloride 10 mL Intravenous Q12H     Continuous Infusions:   PRN Meds:.•  acetaminophen **OR** acetaminophen **OR** acetaminophen  •  nitroglycerin  •  ondansetron **OR** ondansetron  •  sodium chloride     Assessment/Plan       Near syncope  Palpitations  Tachycardia    Labs, including d-dimer, TSH, electrolytes, troponin - all normal.  Echo - normal. Telemetry - normal.  Stress test today.  If normal, plan to discharge home with event monitor.         Plan for disposition:YVAN Hauser MD  01/14/20  7:48 AM

## 2020-01-16 PROCEDURE — 93010 ELECTROCARDIOGRAM REPORT: CPT | Performed by: INTERNAL MEDICINE

## 2020-01-16 NOTE — DISCHARGE SUMMARY
Date of Discharge:  1/15/2020    Discharge Diagnosis: Palpitations, syncope    Presenting Problem/History of Present Illness  Active Hospital Problems    Diagnosis  POA   • **Near syncope [R55]  Yes      Resolved Hospital Problems   No resolved problems to display.          Hospital Course  Patient is a 74 y.o. female presented with sudden onset of one episode of palpitations, tachycardia and near syncope.  She had no significant cardiac risk factors.  Cardiac work-up including echocardiogram, telemetry, stress test was normal.  She ruled out for any acute myocardial infarction.  Symptoms did not recur while she was in the hospital.  She did have occasional PVCs.  She will be started on very low-dose beta-blocker.  She will wear an event monitor.  She will follow-up with our office in the next 1 to 2 weeks and she will see the cardiologist after her cardiac event monitor is been completed.    Procedures Performed         Consults:   Consults     Date and Time Order Name Status Description    1/13/2020 1425 Inpatient Cardiology Consult Completed           Pertinent Test Results:    Lab Results (most recent)     Procedure Component Value Units Date/Time    Troponin [[id number]]  (Normal) Collected:  01/14/20 0713    Specimen:  Blood Updated:  01/14/20 0804     Troponin T <0.010 ng/mL     Narrative:       Troponin T Reference Range:  <= 0.03 ng/mL-   Negative for AMI  >0.03 ng/mL-     Abnormal for myocardial necrosis.  Clinicians would have to utilize clinical acumen, EKG, Troponin and serial changes to determine if it is an Acute Myocardial Infarction or myocardial injury due to an underlying chronic condition.       Results may be falsely decreased if patient taking Biotin.      Comprehensive Metabolic Panel [[id number]] Collected:  01/14/20 0407    Specimen:  Blood Updated:  01/14/20 0720     Glucose 91 mg/dL      BUN 12 mg/dL      Creatinine 0.77 mg/dL      Sodium 141 mmol/L      Potassium 3.8 mmol/L      Chloride  104 mmol/L      CO2 24.0 mmol/L      Calcium 9.1 mg/dL      Total Protein 6.7 g/dL      Albumin 4.00 g/dL      ALT (SGPT) 12 U/L      AST (SGOT) 19 U/L      Alkaline Phosphatase 79 U/L      Total Bilirubin 0.5 mg/dL      eGFR Non African Amer 73 mL/min/1.73      Globulin 2.7 gm/dL      A/G Ratio 1.5 g/dL      BUN/Creatinine Ratio 15.6     Anion Gap 13.0 mmol/L     Narrative:       GFR Normal >60  Chronic Kidney Disease <60  Kidney Failure <15      Troponin [[id number]]  (Normal) Collected:  01/14/20 0407    Specimen:  Blood Updated:  01/14/20 0617     Troponin T <0.010 ng/mL     Narrative:       Troponin T Reference Range:  <= 0.03 ng/mL-   Negative for AMI  >0.03 ng/mL-     Abnormal for myocardial necrosis.  Clinicians would have to utilize clinical acumen, EKG, Troponin and serial changes to determine if it is an Acute Myocardial Infarction or myocardial injury due to an underlying chronic condition.       Results may be falsely decreased if patient taking Biotin.      Lipid Panel [[id number]]  (Abnormal) Collected:  01/14/20 0407    Specimen:  Blood Updated:  01/14/20 0617     Total Cholesterol 167 mg/dL      Triglycerides 54 mg/dL      HDL Cholesterol 86 mg/dL      LDL Cholesterol  70 mg/dL      VLDL Cholesterol 10.8 mg/dL      LDL/HDL Ratio 0.82    Narrative:       Cholesterol Reference Ranges  (U.S. Department of Health and Human Services ATP III Classifications)    Desirable          <200 mg/dL  Borderline High    200-239 mg/dL  High Risk          >240 mg/dL      Triglyceride Reference Ranges  (U.S. Department of Health and Human Services ATP III Classifications)    Normal           <150 mg/dL  Borderline High  150-199 mg/dL  High             200-499 mg/dL  Very High        >500 mg/dL    HDL Reference Ranges  (U.S. Department of Health and Human Services ATP III Classifcations)    Low     <40 mg/dl (major risk factor for CHD)  High    >60 mg/dl ('negative' risk factor for CHD)        LDL Reference Ranges  (U.S.  Department of Health and Human Services ATP III Classifcations)    Optimal          <100 mg/dL  Near Optimal     100-129 mg/dL  Borderline High  130-159 mg/dL  High             160-189 mg/dL  Very High        >189 mg/dL    D-dimer, Quantitative [[id number]]  (Abnormal) Collected:  01/13/20 1830    Specimen:  Blood Updated:  01/13/20 1930     D-Dimer, Quantitative 0.69 MCGFEU/mL     Narrative:       Reference Range  --------------------------------------------------------------------     < 0.50   Negative Predictive Value  0.50-0.59   Indeterminate    >= 0.60   Probable VTE             A very low percentage of patients with DVT may yield D-Dimer results   below the cut-off of 0.50 MCGFEU/mL.  This is known to be more   prevalent in patients with distal DVT.             Results of this test should always be interpreted in conjunction with   the patient's medical history, clinical presentation and other   findings.  Clinical diagnosis should not be based on the result of   [place] D-Dimer alone.    BNP [[id number]]  (Normal) Collected:  01/13/20 1433    Specimen:  Blood Updated:  01/13/20 1510     proBNP 650.6 pg/mL     Narrative:       Among patients with dyspnea, NT-proBNP is highly sensitive for the detection of acute congestive heart failure. In addition NT-proBNP of <300 pg/ml effectively rules out acute congestive heart failure with 99% negative predictive value.    Results may be falsely decreased if patient taking Biotin.      T4, Free [[id number]]  (Normal) Collected:  01/13/20 1433    Specimen:  Blood Updated:  01/13/20 1510     Free T4 1.24 ng/dL     Narrative:       Results may be falsely increased if patient taking Biotin.      TSH [[id number]]  (Normal) Collected:  01/13/20 1433    Specimen:  Blood Updated:  01/13/20 1510     TSH 2.500 uIU/mL     Comprehensive Metabolic Panel [[id number]]  (Abnormal) Collected:  01/13/20 1433    Specimen:  Blood Updated:  01/13/20 1507     Glucose 104 mg/dL      BUN 10 mg/dL       Creatinine 0.80 mg/dL      Sodium 141 mmol/L      Potassium 4.1 mmol/L      Chloride 102 mmol/L      CO2 29.0 mmol/L      Calcium 9.8 mg/dL      Total Protein 7.0 g/dL      Albumin 4.20 g/dL      ALT (SGPT) 13 U/L      AST (SGOT) 20 U/L      Alkaline Phosphatase 86 U/L      Total Bilirubin 0.4 mg/dL      eGFR Non African Amer 70 mL/min/1.73      Globulin 2.8 gm/dL      A/G Ratio 1.5 g/dL      BUN/Creatinine Ratio 12.5     Anion Gap 10.0 mmol/L     Narrative:       GFR Normal >60  Chronic Kidney Disease <60  Kidney Failure <15      Magnesium [[id number]]  (Normal) Collected:  01/13/20 1433    Specimen:  Blood Updated:  01/13/20 1507     Magnesium 2.1 mg/dL     CBC Auto Differential [[id number]]  (Normal) Collected:  01/13/20 1433    Specimen:  Blood Updated:  01/13/20 1444     WBC 6.50 10*3/mm3      RBC 4.51 10*6/mm3      Hemoglobin 14.2 g/dL      Hematocrit 42.3 %      MCV 93.7 fL      MCH 31.5 pg      MCHC 33.6 g/dL      RDW 15.2 %      RDW-SD 49.4 fl      MPV 7.3 fL      Platelets 277 10*3/mm3      Neutrophil % 66.3 %      Lymphocyte % 27.2 %      Monocyte % 5.6 %      Eosinophil % 0.3 %      Basophil % 0.6 %      Neutrophils, Absolute 4.30 10*3/mm3      Lymphocytes, Absolute 1.80 10*3/mm3      Monocytes, Absolute 0.40 10*3/mm3      Eosinophils, Absolute 0.00 10*3/mm3      Basophils, Absolute 0.00 10*3/mm3      nRBC 0.0 /100 WBC            Results for orders placed during the hospital encounter of 01/13/20   Transthoracic Echo Complete With Contrast if Necessary Per Protocol    Narrative The aortic valve is trileaflet with adequate opening and coaptation  The mitral and tricuspid valves are grossly structurally normal and   demonstrate satisfactory diastolic liver excursion  Pulmonic  valve is grossly normal  RV dimension systolic function within normal limits  LV chamber size wall thickness and global contractility are satisfactory;   LVEF 65 to 70%  Left atrial chamber dimension is normal  Aortic root with is  normal  No pericardial effusion, intracardiac mass or vegetation    Supplementary Doppler exam shows normal aortic and mitral valve flow   velocities with no significant regurgitant jets  RVSP less than 10 mmHg    Interpretation:   Normal 2D M-mode and Doppler echo exam for age.                         Condition on Discharge: Stable    Vital Signs       Physical Exam:     General Appearance:    Alert, cooperative, in no acute distress   Head:    Normocephalic, without obvious abnormality, atraumatic   Eyes:            Lids and lashes normal, conjunctivae and sclerae normal, no   icterus, no pallor, corneas clear, PERRLA   Ears:    Ears appear intact with no abnormalities noted   Throat:   No oral lesions, no thrush, oral mucosa moist   Neck:   No adenopathy, supple, trachea midline, no thyromegaly, no   carotid bruit, no JVD   Lungs:     Clear to auscultation,respirations regular, even and                  unlabored    Heart:    Regular rhythm and normal rate, normal S1 and S2, no            murmur, no gallop, no rub, no click   Chest Wall:    No abnormalities observed   Abdomen:     Normal bowel sounds, no masses, no organomegaly, soft        non-tender, non-distended, no guarding, no rebound                tenderness   Extremities:   Moves all extremities well, no edema, no cyanosis, no             redness   Pulses:   Pulses palpable and equal bilaterally   Skin:   No bleeding, bruising or rash   Lymph nodes:   No palpable adenopathy   Neurologic:   Cranial nerves 2 - 12 grossly intact, sensation intact, DTR       present and equal bilaterally       Discharge Disposition  Home or Self Care    Discharge Medications     Discharge Medications      New Medications      Instructions Start Date   metoprolol succinate XL 25 MG 24 hr tablet  Commonly known as:  TOPROL XL   Take 1/2 tablet once a day in the morning.         Continue These Medications      Instructions Start Date   atorvastatin 20 MG tablet  Commonly known  as:  LIPITOR   20 mg, Oral, Daily      calcium citrate-vitamin d 200-250 MG-UNIT tablet tablet  Commonly known as:  CITRACAL   1 tablet, Oral, 2 Times Daily      fish oil 1000 MG capsule capsule   600 mg, Oral, 2 Times Daily With Meals             Discharge Diet:   Diet Instructions     Healthy heart diet               Activity at Discharge:   Activity Instructions     Resume normal daily activity as tolerated               Follow-up Appointments  No future appointments.  Additional Instructions for the Follow-ups that You Need to Schedule     Discharge Follow-up with PCP   As directed       Currently Documented PCP:    Elizabeth Nazario MD    PCP Phone Number:    [phone]     Follow Up Details:  Two weeks         Discharge Follow-up with Specified Provider: Dr. Bernal; 6 Weeks   As directed      To:  Dr. Bernal    Follow Up:  6 Weeks               Test Results Pending at Discharge       Tita Hauser MD  01/15/20  7:51 PM    Time: Discharge 25 min

## 2020-01-30 LAB
BH CV NUCLEAR PRIOR STUDY: 3
BH CV STRESS BP STAGE 1: NORMAL
BH CV STRESS BP STAGE 2: NORMAL
BH CV STRESS BP STAGE 3: NORMAL
BH CV STRESS BP STAGE 4: NORMAL
BH CV STRESS DURATION MIN STAGE 1: 1
BH CV STRESS DURATION MIN STAGE 2: 1
BH CV STRESS DURATION MIN STAGE 3: 1
BH CV STRESS DURATION MIN STAGE 4: 1
BH CV STRESS DURATION SEC STAGE 1: 0
BH CV STRESS DURATION SEC STAGE 2: 0
BH CV STRESS DURATION SEC STAGE 3: 0
BH CV STRESS DURATION SEC STAGE 4: 0
BH CV STRESS GRADE STAGE 1: 10
BH CV STRESS GRADE STAGE 2: 1
BH CV STRESS GRADE STAGE 3: 0
BH CV STRESS GRADE STAGE 4: 0
BH CV STRESS HR STAGE 1: 116
BH CV STRESS HR STAGE 2: 130
BH CV STRESS HR STAGE 3: 132
BH CV STRESS HR STAGE 4: 134
BH CV STRESS METS STAGE 1: 2.3
BH CV STRESS METS STAGE 2: 3.5
BH CV STRESS METS STAGE 3: 4.6
BH CV STRESS METS STAGE 4: 7
BH CV STRESS PROTOCOL 1: NORMAL
BH CV STRESS RECOVERY BP: NORMAL MMHG
BH CV STRESS RECOVERY HR: 100 BPM
BH CV STRESS SPEED STAGE 1: 1.4
BH CV STRESS SPEED STAGE 2: 1.4
BH CV STRESS SPEED STAGE 3: 1.2
BH CV STRESS SPEED STAGE 4: 1.1
BH CV STRESS STAGE 1: 1
BH CV STRESS STAGE 2: 2
BH CV STRESS STAGE 3: 3
BH CV STRESS STAGE 4: 4
LV EF NUC BP: 73 %
MAXIMAL PREDICTED HEART RATE: 146 BPM
PERCENT MAX PREDICTED HR: 91.78 %
STRESS BASELINE BP: NORMAL MMHG
STRESS BASELINE HR: 100 BPM
STRESS PERCENT HR: 108 %
STRESS POST ESTIMATED WORKLOAD: 4 METS
STRESS POST EXERCISE DUR MIN: 4 MIN
STRESS POST EXERCISE DUR SEC: 0 SEC
STRESS POST PEAK BP: NORMAL MMHG
STRESS POST PEAK HR: 134 BPM
STRESS TARGET HR: 124 BPM

## 2020-01-30 PROCEDURE — 78452 HT MUSCLE IMAGE SPECT MULT: CPT | Performed by: INTERNAL MEDICINE

## 2020-01-30 PROCEDURE — 93018 CV STRESS TEST I&R ONLY: CPT | Performed by: INTERNAL MEDICINE

## 2020-02-19 LAB
Lab: 11
TOAL ENROLLMENT DAYS: 30

## 2020-02-19 PROCEDURE — 93228 REMOTE 30 DAY ECG REV/REPORT: CPT | Performed by: INTERNAL MEDICINE

## 2020-02-26 ENCOUNTER — OFFICE VISIT (OUTPATIENT)
Dept: CARDIOLOGY | Facility: CLINIC | Age: 75
End: 2020-02-26

## 2020-02-26 VITALS
HEIGHT: 65 IN | BODY MASS INDEX: 22.26 KG/M2 | SYSTOLIC BLOOD PRESSURE: 118 MMHG | HEART RATE: 77 BPM | DIASTOLIC BLOOD PRESSURE: 72 MMHG | WEIGHT: 133.6 LBS

## 2020-02-26 DIAGNOSIS — I47.1 PAROXYSMAL SVT (SUPRAVENTRICULAR TACHYCARDIA) (HCC): Primary | ICD-10-CM

## 2020-02-26 DIAGNOSIS — R00.2 PALPITATIONS: ICD-10-CM

## 2020-02-26 DIAGNOSIS — R06.02 SHORTNESS OF BREATH: ICD-10-CM

## 2020-02-26 PROBLEM — I47.10 PAROXYSMAL SVT (SUPRAVENTRICULAR TACHYCARDIA): Status: ACTIVE | Noted: 2020-02-26

## 2020-02-26 PROCEDURE — 93000 ELECTROCARDIOGRAM COMPLETE: CPT | Performed by: NURSE PRACTITIONER

## 2020-02-26 PROCEDURE — 99213 OFFICE O/P EST LOW 20 MIN: CPT | Performed by: NURSE PRACTITIONER

## 2020-02-26 NOTE — PROGRESS NOTES
Cardiology Office Follow Up Visit      Primary Care Provider:  Elizabeth Nazario MD    Reason for f/u:     PSVT  Palpitations  Shortness of breath      Subjective     CC:    Denies recurrent chest pain, palpitations, dyspnea    History of Present Illness       Mary Kay Montero is a 74 y.o. female.  Patient was recently admitted at [facility] following an episode of palpitations tachycardia and shortness of breath.  She is a retired RN.  She checked her heart rate at home with her stethoscope and said it was approximately 160.  She laid down for a while and when she woke her heart rate was back to normal.  She had never previously had SVT or arrhythmias.    She was admitted to the hospital for further evaluation.  The patient underwent a 2D echocardiogram that was essentially normal.  Her ejection fraction was normal there was no significant valvular flow abnormalities.  She had a stress Myoview completing 4 minutes of Yoni protocol achieving 90% of max predicted heart rate for age.  She had no chest pain and nuclear images showed no reversible ischemia.    The patient was discharged with an event monitor on.  Event monitoring showed no sustained tachyarrhythmias or bradycardia.  She had isolated PACs.  No other arrhythmias detected.    The patient reports today for follow-up.  She has had no recurrent feelings of palpitations, dyspnea, tachycardia.  She was started on low-dose metoprolol in the hospital and is continued that.    She denies any chest pain, dyspnea, PND, orthopnea, palpitations, near syncope, lower extremity edema or feelings of her heart racing.  She reports compliance with medical therapy.    Her hospital records have been reviewed including cardiology consultation discharge summary and cardiac testing.        Past Medical History:   Diagnosis Date   • Hyperlipidemia        History reviewed. No pertinent surgical history.      Current Outpatient Medications:   •  atorvastatin (LIPITOR) 20 MG  tablet, Take 20 mg by mouth Daily., Disp: , Rfl:   •  calcium citrate-vitamin d (CITRACAL) 200-250 MG-UNIT tablet tablet, Take 1 tablet by mouth 2 (Two) Times a Day., Disp: , Rfl:   •  metoprolol succinate XL (TOPROL XL) 25 MG 24 hr tablet, Take 1/2 tablet once a day in the morning., Disp: 30 tablet, Rfl: 1  •  Omega-3 Fatty Acids (FISH OIL) 1000 MG capsule capsule, Take 600 mg by mouth 2 (Two) Times a Day With Meals., Disp: , Rfl:     Social History     Socioeconomic History   • Marital status:      Spouse name: Not on file   • Number of children: Not on file   • Years of education: Not on file   • Highest education level: Not on file   Tobacco Use   • Smoking status: Never Smoker   • Smokeless tobacco: Never Used   Substance and Sexual Activity   • Alcohol use: Yes     Alcohol/week: 7.0 standard drinks     Types: 7 Shots of liquor per week   • Drug use: Never   • Sexual activity: Defer       History reviewed. No pertinent family history.    The following portions of the patient's history were reviewed and updated as appropriate: allergies, current medications, past family history, past medical history, past social history, past surgical history and problem list.    Review of Systems   Constitution: Negative for decreased appetite and diaphoresis.   HENT: Negative for congestion, hearing loss and nosebleeds.    Cardiovascular: Negative for chest pain, claudication, dyspnea on exertion, irregular heartbeat, leg swelling, near-syncope, orthopnea, palpitations, paroxysmal nocturnal dyspnea and syncope.   Respiratory: Negative for cough, shortness of breath and sleep disturbances due to breathing.    Endocrine: Negative for polyuria.   Hematologic/Lymphatic: Does not bruise/bleed easily.   Skin: Negative for itching and rash.   Musculoskeletal: Negative for back pain, muscle weakness and myalgias.   Gastrointestinal: Negative for abdominal pain, change in bowel habit and nausea.   Genitourinary: Negative for  "dysuria, flank pain, frequency and hesitancy.   Neurological: Negative for dizziness, tremors and weakness.   Psychiatric/Behavioral: Negative for altered mental status. The patient does not have insomnia.      /72   Pulse 77   Ht 165.1 cm (65\")   Wt 60.6 kg (133 lb 9.6 oz)   BMI 22.23 kg/m² .  Objective     Physical Exam   Constitutional: She is oriented to person, place, and time. She appears well-developed and well-nourished. No distress.   HENT:   Head: Normocephalic and atraumatic.   Eyes: Pupils are equal, round, and reactive to light.   Neck: Normal range of motion. Neck supple. No JVD present.   Cardiovascular: Normal rate, regular rhythm, S1 normal, S2 normal, normal heart sounds and intact distal pulses.   No murmur heard.  Pulmonary/Chest: Effort normal and breath sounds normal.   Abdominal: Soft. Normal appearance. She exhibits no distension. There is no tenderness.   Musculoskeletal: Normal range of motion. She exhibits no edema.   Neurological: She is alert and oriented to person, place, and time.   Skin: Skin is warm and dry.   Psychiatric: She has a normal mood and affect.           ECG 12 Lead  Date/Time: 2/26/2020 1:24 PM  Performed by: Dianne Gonzalez APRN  Authorized by: Dianne Gonzalez APRN   Comparison: not compared with previous ECG   Previous ECG: no previous ECG available  Rhythm: sinus rhythm  Rate: normal  BPM: 77  Conduction: conduction normal  ST Segments: ST segments normal  T Waves: T waves normal  QRS axis: normal  Other: no other findings    Clinical impression: normal ECG                     Diagnoses and all orders for this visit:    1. Paroxysmal SVT (supraventricular tachycardia) (CMS/HCC) (Primary)  Comments:  Recent hospitalization at [facility] following an episode of presumed SVT with heart rate to 160.  No recurrent since hospital discharge    2. Palpitations  Comments:  No recent or recurrent palpitations since hospital discharge    3. Shortness of " breath  Comments:  No recurrent shortness of breath since hospital discharge.  Stress Myoview negative for ischemia and echocardiogram normal LV function and no valvular flow abno    Other orders  -     ECG 12 Lead             Plan:  Continue current medications as prescribed  The patient has been advised to continue her normal ADLs.  She has had no recurrent palpitations or evidence of SVT.    At this point we continue low-dose beta-blocker if she is tolerating it.    The patient's been advised if she has recurrent feelings of palpitations or tachycardia we may make further recommendations but for now would continue current treatment.    I have scheduled her for a follow-up back here in 6 months if she has any new or worsening problems we be happy to see her at any point sooner

## 2020-08-27 ENCOUNTER — OFFICE VISIT (OUTPATIENT)
Dept: CARDIOLOGY | Facility: CLINIC | Age: 75
End: 2020-08-27

## 2020-08-27 VITALS
DIASTOLIC BLOOD PRESSURE: 72 MMHG | BODY MASS INDEX: 21.66 KG/M2 | SYSTOLIC BLOOD PRESSURE: 128 MMHG | HEART RATE: 74 BPM | HEIGHT: 65 IN | WEIGHT: 130 LBS | OXYGEN SATURATION: 97 %

## 2020-08-27 DIAGNOSIS — I47.1 PAROXYSMAL SVT (SUPRAVENTRICULAR TACHYCARDIA) (HCC): Primary | ICD-10-CM

## 2020-08-27 DIAGNOSIS — R00.2 PALPITATIONS: ICD-10-CM

## 2020-08-27 DIAGNOSIS — R06.02 SHORTNESS OF BREATH: ICD-10-CM

## 2020-08-27 PROCEDURE — 99213 OFFICE O/P EST LOW 20 MIN: CPT | Performed by: NURSE PRACTITIONER

## 2020-08-27 NOTE — PROGRESS NOTES
Cardiology Office Visit Note      Referring physician:  Elizabeth Nazario MD    Reason For Followup:  6 month  HPI:    Mary Kay Montero is a 74 y.o. female. Presents to [facility] for a follow up visit on  palpitations tachycardia and shortness of breath.  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  She sat a while and rate was back to normal.      2D echocardiogram on 01/13/2020  that was essentially normal.  Her ejection fraction was normal there was no significant valvular flow abnormalities.     Stress Myoview on 01/14/2020 completing 4 minutes of Yoni protocol achieving 90% of max predicted heart rate for age.  She had no chest pain and nuclear images showed no reversible ischemia.    The patient was discharged with an event monitor on.  Event monitoring showed no sustained tachyarrhythmias or bradycardia.  She had isolated PACs.  No other arrhythmias detected.    She has had one recurrent feelings of dyspnea, tachycardia and feelings of her heart racing .      She denies any chest pain, PND, orthopnea, palpitations, near syncope, lower extremity edema.  She reports compliance with medical therapy.    Reviewed above and agree              Past Medical History:   Diagnosis Date   • Hyperlipidemia        History reviewed. No pertinent surgical history.      Current Outpatient Medications:   •  atorvastatin (LIPITOR) 20 MG tablet, Take 20 mg by mouth Daily., Disp: , Rfl:   •  Biotin 5000 MCG capsule, Take 5,000 mcg by mouth Daily., Disp: , Rfl:   •  calcium citrate-vitamin d (CITRACAL) 200-250 MG-UNIT tablet tablet, Take 1 tablet by mouth 2 (Two) Times a Day., Disp: , Rfl:   •  metoprolol succinate XL (TOPROL XL) 25 MG 24 hr tablet, Take 1/2 tablet once a day in the morning., Disp: 30 tablet, Rfl: 1  •  Misc Natural Products (LUTEIN 20 PO), Take 20 mg by mouth Daily., Disp: , Rfl:   •  Omega-3 Fatty Acids (FISH OIL) 1000 MG capsule capsule, Take 600 mg by  "mouth 2 (Two) Times a Day With Meals., Disp: , Rfl:     Social History     Socioeconomic History   • Marital status:      Spouse name: Not on file   • Number of children: Not on file   • Years of education: Not on file   • Highest education level: Not on file   Tobacco Use   • Smoking status: Never Smoker   • Smokeless tobacco: Never Used   Substance and Sexual Activity   • Alcohol use: Yes     Alcohol/week: 7.0 standard drinks     Types: 7 Shots of liquor per week   • Drug use: Never   • Sexual activity: Defer       History reviewed. No pertinent family history.      Review of Systems   General: denies fever, chills, anorexia, weight loss  Eyes: denies blurring, diplopia  Ear/Nose/Throat: denies ear pain, nosebleeds, hoarseness  Cardiovascular: See HPI  Respiratory: denies excessive sputum, hemoptysis, wheezing  Gastrointestinal: denies nausea, vomiting, change in bowel habits, abdominal pain  Genitourinary: Not applicable  Musculoskeletal: Denies musculoskeletal complaints  Skin: denies rashes, itching, suspicious lesions  Neurologic: denies focal neuro deficits  Psychiatric: denies depression, anxiety  Endocrine: denies cold intolerance, heat intolerance  Hematologic/Lymphatic: denies abnormal bruising, bleeding  Allergic/Immunologic: denies urticaria or persistent infections      Objective     Visit Vitals  /72   Pulse 74   Ht 165.1 cm (65\")   Wt 59 kg (130 lb)   SpO2 97%   BMI 21.63 kg/m²           Physical Exam  General:     Obese, well developed,, in no acute distress.    Head:     normocephalic and atraumatic.    Eyes:    PERRL/EOM intact, conjunctiva and sclera clear with out nystagmus.    Neck:    no jvd or bruits  Chest Wall:    no deformities   Lungs:    clear bilaterally to auscultation with adequate global airflow   Heart:    non-displaced PMI; regular rate and rhythm, normal S1, S2 without murmurs, rubs, or gallops  Abdomen:  Soft, nontender without HSM  Msk:    no deformity; adequate R " OM  Pulses:    pulses normal in all 4 extremities.    Extremities:    no clubbing, cyanosis, edema   Neurologic:    no focal sensory or motor deficits  Skin:    intact without lesions or rashes.    Psych:    alert and cooperative; normal mood and affect; normal attention span and concentration.            ECG 12 Lead  Date/Time: 8/28/2020 3:49 PM  Performed by: Dianne Gonzalez APRN  Authorized by: Dianne Gonzalez APRN   Rhythm: sinus rhythm  Rate: normal  BPM: 74  QRS axis: normal  Other findings: left atrial abnormality    Clinical impression: non-specific ECG              Assessment:   Problems Addressed this Visit        Cardiovascular and Mediastinum    Palpitations    Paroxysmal SVT (supraventricular tachycardia) (CMS/HCC) - Primary    Relevant Orders    ECG 12 Lead       Respiratory    Shortness of breath    Relevant Orders    ECG 12 Lead            Plan:  Patient's rhythm and hemodynamics are stable.  She is only had one brief recurrence of PSVT which lasted only a few minutes and was resolved with rest.    She is participating in regular activities with no complaints.  Tolerating medications without problems.    At this time we will continue current treatment if she has any new or worsening problems have asked her to contact the office sooner otherwise we will plan on seeing her back in the office for scheduled follow-up in 6 months    OLEKSANDR Carlson  8/28/2020 15:49    This report was generated using the Dragon voice recognition system.

## 2020-08-28 PROCEDURE — 93000 ELECTROCARDIOGRAM COMPLETE: CPT | Performed by: NURSE PRACTITIONER

## 2020-10-12 RX ORDER — METOPROLOL SUCCINATE 25 MG/1
TABLET, EXTENDED RELEASE ORAL
Qty: 30 TABLET | Refills: 3 | Status: SHIPPED | OUTPATIENT
Start: 2020-10-12 | End: 2021-06-15 | Stop reason: SDUPTHER

## 2020-10-12 NOTE — TELEPHONE ENCOUNTER
Seen Dianne back in August was suppose to have metoprolol 25 mg qty 90 sent to [facility]  She is almost out

## 2021-03-11 PROBLEM — H25.819 COMBINED FORM OF SENILE CATARACT: Status: ACTIVE | Noted: 2017-10-25

## 2021-03-11 PROBLEM — H35.369 MACULAR DRUSEN: Status: ACTIVE | Noted: 2017-10-25

## 2021-03-11 PROBLEM — H52.4 PRESBYOPIA: Status: ACTIVE | Noted: 2017-10-25

## 2021-03-11 PROBLEM — H35.379 EPIRETINAL MEMBRANE: Status: ACTIVE | Noted: 2017-10-25

## 2021-03-12 ENCOUNTER — OFFICE VISIT (OUTPATIENT)
Dept: CARDIOLOGY | Facility: CLINIC | Age: 76
End: 2021-03-12

## 2021-03-12 VITALS
HEART RATE: 70 BPM | OXYGEN SATURATION: 99 % | HEIGHT: 65 IN | DIASTOLIC BLOOD PRESSURE: 80 MMHG | SYSTOLIC BLOOD PRESSURE: 132 MMHG | BODY MASS INDEX: 21.49 KG/M2 | WEIGHT: 129 LBS

## 2021-03-12 DIAGNOSIS — I47.1 PAROXYSMAL SVT (SUPRAVENTRICULAR TACHYCARDIA) (HCC): Primary | ICD-10-CM

## 2021-03-12 DIAGNOSIS — R06.02 SHORTNESS OF BREATH: ICD-10-CM

## 2021-03-12 PROCEDURE — 93000 ELECTROCARDIOGRAM COMPLETE: CPT | Performed by: INTERNAL MEDICINE

## 2021-03-12 PROCEDURE — 99213 OFFICE O/P EST LOW 20 MIN: CPT | Performed by: INTERNAL MEDICINE

## 2021-06-02 ENCOUNTER — APPOINTMENT (OUTPATIENT)
Dept: GENERAL RADIOLOGY | Facility: HOSPITAL | Age: 76
End: 2021-06-02

## 2021-06-02 ENCOUNTER — HOSPITAL ENCOUNTER (EMERGENCY)
Facility: HOSPITAL | Age: 76
Discharge: HOME | End: 2021-06-02
Attending: EMERGENCY MEDICINE | Admitting: EMERGENCY MEDICINE

## 2021-06-02 ENCOUNTER — TELEPHONE (OUTPATIENT)
Dept: CARDIOLOGY | Facility: CLINIC | Age: 76
End: 2021-06-02

## 2021-06-02 VITALS
HEIGHT: 66 IN | TEMPERATURE: 97.5 F | DIASTOLIC BLOOD PRESSURE: 82 MMHG | BODY MASS INDEX: 20.44 KG/M2 | RESPIRATION RATE: 16 BRPM | OXYGEN SATURATION: 99 % | WEIGHT: 127.21 LBS | HEART RATE: 83 BPM | SYSTOLIC BLOOD PRESSURE: 129 MMHG

## 2021-06-02 DIAGNOSIS — R07.9 CHEST PAIN, UNSPECIFIED TYPE: Primary | ICD-10-CM

## 2021-06-02 DIAGNOSIS — R00.2 PALPITATIONS: ICD-10-CM

## 2021-06-02 LAB
ANION GAP SERPL CALCULATED.3IONS-SCNC: 14 MMOL/L (ref 5–15)
BASOPHILS # BLD AUTO: 0 10*3/MM3 (ref 0–0.2)
BASOPHILS NFR BLD AUTO: 0.3 % (ref 0–1.5)
BUN SERPL-MCNC: 10 MG/DL (ref 8–23)
BUN/CREAT SERPL: 11.9 (ref 7–25)
CALCIUM SPEC-SCNC: 9.2 MG/DL (ref 8.6–10.5)
CHLORIDE SERPL-SCNC: 99 MMOL/L (ref 98–107)
CO2 SERPL-SCNC: 26 MMOL/L (ref 22–29)
CREAT SERPL-MCNC: 0.84 MG/DL (ref 0.57–1)
DEPRECATED RDW RBC AUTO: 43.8 FL (ref 37–54)
EOSINOPHIL # BLD AUTO: 0.1 10*3/MM3 (ref 0–0.4)
EOSINOPHIL NFR BLD AUTO: 0.8 % (ref 0.3–6.2)
ERYTHROCYTE [DISTWIDTH] IN BLOOD BY AUTOMATED COUNT: 13.2 % (ref 12.3–15.4)
GFR SERPL CREATININE-BSD FRML MDRD: 66 ML/MIN/1.73
GLUCOSE SERPL-MCNC: 94 MG/DL (ref 65–99)
HCT VFR BLD AUTO: 43.3 % (ref 34–46.6)
HGB BLD-MCNC: 15 G/DL (ref 12–15.9)
LYMPHOCYTES # BLD AUTO: 2.3 10*3/MM3 (ref 0.7–3.1)
LYMPHOCYTES NFR BLD AUTO: 33.5 % (ref 19.6–45.3)
MCH RBC QN AUTO: 33.2 PG (ref 26.6–33)
MCHC RBC AUTO-ENTMCNC: 34.5 G/DL (ref 31.5–35.7)
MCV RBC AUTO: 96.2 FL (ref 79–97)
MONOCYTES # BLD AUTO: 0.5 10*3/MM3 (ref 0.1–0.9)
MONOCYTES NFR BLD AUTO: 6.9 % (ref 5–12)
NEUTROPHILS NFR BLD AUTO: 4 10*3/MM3 (ref 1.7–7)
NEUTROPHILS NFR BLD AUTO: 58.5 % (ref 42.7–76)
NRBC BLD AUTO-RTO: 0.1 /100 WBC (ref 0–0.2)
PLATELET # BLD AUTO: 248 10*3/MM3 (ref 140–450)
PMV BLD AUTO: 7.7 FL (ref 6–12)
POTASSIUM SERPL-SCNC: 3.6 MMOL/L (ref 3.5–5.2)
RBC # BLD AUTO: 4.51 10*6/MM3 (ref 3.77–5.28)
SODIUM SERPL-SCNC: 139 MMOL/L (ref 136–145)
TROPONIN T SERPL-MCNC: <0.01 NG/ML (ref 0–0.03)
WBC # BLD AUTO: 6.8 10*3/MM3 (ref 3.4–10.8)

## 2021-06-02 PROCEDURE — 93005 ELECTROCARDIOGRAM TRACING: CPT

## 2021-06-02 PROCEDURE — 84484 ASSAY OF TROPONIN QUANT: CPT | Performed by: EMERGENCY MEDICINE

## 2021-06-02 PROCEDURE — 93005 ELECTROCARDIOGRAM TRACING: CPT | Performed by: EMERGENCY MEDICINE

## 2021-06-02 PROCEDURE — 99283 EMERGENCY DEPT VISIT LOW MDM: CPT

## 2021-06-02 PROCEDURE — 71045 X-RAY EXAM CHEST 1 VIEW: CPT

## 2021-06-02 PROCEDURE — 80048 BASIC METABOLIC PNL TOTAL CA: CPT | Performed by: EMERGENCY MEDICINE

## 2021-06-02 PROCEDURE — 85025 COMPLETE CBC W/AUTO DIFF WBC: CPT | Performed by: EMERGENCY MEDICINE

## 2021-06-02 RX ORDER — SODIUM CHLORIDE 0.9 % (FLUSH) 0.9 %
10 SYRINGE (ML) INJECTION AS NEEDED
Status: DISCONTINUED | OUTPATIENT
Start: 2021-06-02 | End: 2021-06-02 | Stop reason: HOSPADM

## 2021-06-02 NOTE — TELEPHONE ENCOUNTER
Pt called back- her BP is 144/99 and her heart rate in 124.She just does not feel good-anxious. I advised the ER for evaluation.

## 2021-06-02 NOTE — TELEPHONE ENCOUNTER
Patient calling- states her HR today has gone up to 132. She had a H/O SVT, takes Metoprolol XL 25mg 1/2 daily, but took another 1/2 of one about 15 min ago. She denies change in diet or excessive caffeine.   Dr Bernal did not make any changes at her March OV and she is to follow up in 1 year. She does not think she needs to go to the ER but is very anxious. Any recommendation?

## 2021-06-02 NOTE — ED PROVIDER NOTES
Subjective   Patient is a 75-year-old female complaining of palpitations and sharp pain in her chest intimately for the past several days.  Feeling she may have been in A. fib again.  She denies cough fever shortness of breath or other complaint          Review of Systems  Negative for headache ears no cough fever shortness of breath abdominal pain vomiting diarrhea dysuria achiness weight loss or other complaint.  A complete review systems was obtained and is otherwise negative  Past Medical History:   Diagnosis Date   • Hyperlipidemia    • SVT (supraventricular tachycardia) (CMS/HCC)        No Known Allergies    No past surgical history on file.    Family History   Problem Relation Age of Onset   • No Known Problems Mother    • No Known Problems Father        Social History     Socioeconomic History   • Marital status:      Spouse name: Not on file   • Number of children: Not on file   • Years of education: Not on file   • Highest education level: Not on file   Tobacco Use   • Smoking status: Never Smoker   • Smokeless tobacco: Never Used   Vaping Use   • Vaping Use: Never used   Substance and Sexual Activity   • Alcohol use: Yes     Alcohol/week: 7.0 standard drinks     Types: 7 Shots of liquor per week   • Drug use: Never   • Sexual activity: Defer           Objective   Physical Exam  HEENT exam shows TMs to be clear.  Oropharynx clear moist.  Sclera nonicteric.  Neck has no adenopathy JVD or bruits.  Lungs clear.  Heart has regular rate rhythm without murmur or gallop.  Chest is nontender.  Abdomen is soft nontender.  Extremity exam is unremarkable.  Procedures     My EKG interpretation shows normal sinus rhythm with no acute ST change      ED Course            Results for orders placed or performed during the hospital encounter of 06/02/21   Basic Metabolic Panel    Specimen: Blood   Result Value Ref Range    Glucose 94 65 - 99 mg/dL    BUN 10 8 - 23 mg/dL    Creatinine 0.84 0.57 - 1.00 mg/dL    Sodium  139 136 - 145 mmol/L    Potassium 3.6 3.5 - 5.2 mmol/L    Chloride 99 98 - 107 mmol/L    CO2 26.0 22.0 - 29.0 mmol/L    Calcium 9.2 8.6 - 10.5 mg/dL    eGFR Non African Amer 66 >60 mL/min/1.73    BUN/Creatinine Ratio 11.9 7.0 - 25.0    Anion Gap 14.0 5.0 - 15.0 mmol/L   Troponin    Specimen: Blood   Result Value Ref Range    Troponin T <0.010 0.000 - 0.030 ng/mL   CBC Auto Differential    Specimen: Blood   Result Value Ref Range    WBC 6.80 3.40 - 10.80 10*3/mm3    RBC 4.51 3.77 - 5.28 10*6/mm3    Hemoglobin 15.0 12.0 - 15.9 g/dL    Hematocrit 43.3 34.0 - 46.6 %    MCV 96.2 79.0 - 97.0 fL    MCH 33.2 (H) 26.6 - 33.0 pg    MCHC 34.5 31.5 - 35.7 g/dL    RDW 13.2 12.3 - 15.4 %    RDW-SD 43.8 37.0 - 54.0 fl    MPV 7.7 6.0 - 12.0 fL    Platelets 248 140 - 450 10*3/mm3    Neutrophil % 58.5 42.7 - 76.0 %    Lymphocyte % 33.5 19.6 - 45.3 %    Monocyte % 6.9 5.0 - 12.0 %    Eosinophil % 0.8 0.3 - 6.2 %    Basophil % 0.3 0.0 - 1.5 %    Neutrophils, Absolute 4.00 1.70 - 7.00 10*3/mm3    Lymphocytes, Absolute 2.30 0.70 - 3.10 10*3/mm3    Monocytes, Absolute 0.50 0.10 - 0.90 10*3/mm3    Eosinophils, Absolute 0.10 0.00 - 0.40 10*3/mm3    Basophils, Absolute 0.00 0.00 - 0.20 10*3/mm3    nRBC 0.1 0.0 - 0.2 /100 WBC   ECG 12 Lead   Result Value Ref Range    QT Interval 370 ms     XR Chest 1 View    Result Date: 6/2/2021  1. There is a question of emphysema. 2. No acute process demonstrated.  Electronically Signed By-Sandee Lee MD On:6/2/2021 5:59 PM This report was finalized on [id number] by  Sandee Lee MD.                                      MDM  Number of Diagnoses or Management Options  Diagnosis management comments: Patient has benign physical exam patient made in normal sinus rhythm throughout her ED visit but there is no evidence of acute coronary syndrome.  Metabolic panel is at baseline.  There is no evidence of acute infectious process.  Patient was asymptomatic throughout her ED visit.  She will be discharged to  follow with MD for further outpatient evaluation as needed.       Amount and/or Complexity of Data Reviewed  Clinical lab tests: reviewed  Tests in the radiology section of CPT®: reviewed  Tests in the medicine section of CPT®: reviewed    Risk of Complications, Morbidity, and/or Mortality  Presenting problems: high  Diagnostic procedures: high  Management options: high    Patient Progress  Patient progress: stable      Final diagnoses:   Chest pain, unspecified type   Palpitations       ED Disposition  ED Disposition     ED Disposition Condition Comment    Discharge Stable           No follow-up provider specified.       Medication List      No changes were made to your prescriptions during this visit.          Ruben Ng MD  06/02/21 5620

## 2021-06-03 LAB — QT INTERVAL: 370 MS

## 2021-06-15 ENCOUNTER — OFFICE VISIT (OUTPATIENT)
Dept: CARDIOLOGY | Facility: CLINIC | Age: 76
End: 2021-06-15

## 2021-06-15 VITALS
WEIGHT: 130 LBS | BODY MASS INDEX: 20.89 KG/M2 | HEART RATE: 85 BPM | SYSTOLIC BLOOD PRESSURE: 128 MMHG | HEIGHT: 66 IN | DIASTOLIC BLOOD PRESSURE: 82 MMHG

## 2021-06-15 DIAGNOSIS — R00.2 PALPITATIONS: ICD-10-CM

## 2021-06-15 DIAGNOSIS — I47.1 PAROXYSMAL SVT (SUPRAVENTRICULAR TACHYCARDIA) (HCC): Primary | ICD-10-CM

## 2021-06-15 PROBLEM — H35.81 MACULAR RETINAL EDEMA: Status: ACTIVE | Noted: 2020-10-14

## 2021-06-15 PROBLEM — H35.3290 EXUDATIVE AGE-RELATED MACULAR DEGENERATION: Status: ACTIVE | Noted: 2020-10-14

## 2021-06-15 PROBLEM — H35.3190 NONEXUDATIVE AGE-RELATED MACULAR DEGENERATION: Status: ACTIVE | Noted: 2020-10-15

## 2021-06-15 PROBLEM — H35.719 CENTRAL SEROUS CHORIORETINOPATHY: Status: ACTIVE | Noted: 2020-10-14

## 2021-06-15 PROCEDURE — 99213 OFFICE O/P EST LOW 20 MIN: CPT | Performed by: NURSE PRACTITIONER

## 2021-06-15 RX ORDER — METOPROLOL SUCCINATE 25 MG/1
TABLET, EXTENDED RELEASE ORAL
Qty: 30 TABLET | Refills: 11 | Status: SHIPPED | OUTPATIENT
Start: 2021-06-15 | End: 2022-06-15

## 2021-06-21 NOTE — PROGRESS NOTES
Cardiology Office Follow Up Visit      Primary Care Provider:  Elizabeth Nazario MD    Reason for f/u:     Palpitations  Paroxysmal atrial fibrillation  History of SVT      Subjective     CC:    Recent episode of palpitations    History of Present Illness       Mary Kay Montero is a 75 y.o. female.  Patient is a very pleasant female who has a past medical history of palpitations, PSVT     Back in January 2020 the patient presented to [facility] with feelings of palpitations and shortness of breath.  Heart rate was up to 160 per her report.    She underwent echocardiogram and stress Myoview.  These tests were reviewed.  Stress Myoview in January 2020 showed no reversible ischemia.  Echocardiogram from January 2020 was essentially normal ejection fraction was normal and there were no significant valvular flow abnormalities.    The patient was started on metoprolol and has done well.    She reports last week she had an episode of feelings of palpitations and tachycardia with her heart rate being up to 160.  She took an extra half of a Toprol-XL and went into the emergency room.  By the time she arrived there her heart rate was more stable.  And in the 90s.    Labs were obtained which were stable and the patient was discharged home and advised to follow-up here.    Since that time she has had no recurrent feelings of palpitations or tachycardia.  She reports compliance with medical therapy.    Has had no other recent symptoms to suggest unstable angina or heart failure.    Past Medical History:   Diagnosis Date   • Hyperlipidemia    • SVT (supraventricular tachycardia) (CMS/HCC)        History reviewed. No pertinent surgical history.      Current Outpatient Medications:   •  atorvastatin (LIPITOR) 20 MG tablet, Take 20 mg by mouth Daily., Disp: , Rfl:   •  Biotin 5000 MCG capsule, Take 5,000 mcg by mouth Daily., Disp: , Rfl:   •  calcium citrate-vitamin d (CITRACAL) 200-250 MG-UNIT tablet tablet, Take 1 tablet by  mouth 2 (Two) Times a Day., Disp: , Rfl:   •  metoprolol succinate XL (Toprol XL) 25 MG 24 hr tablet, Take 1 tablet daily, Disp: 30 tablet, Rfl: 11  •  Misc Natural Products (LUTEIN 20 PO), Take 20 mg by mouth Daily., Disp: , Rfl:   •  Omega-3 Fatty Acids (FISH OIL) 1000 MG capsule capsule, Take 600 mg by mouth 2 (Two) Times a Day With Meals., Disp: , Rfl:   •  vitamin D3 125 MCG (5000 UT) capsule capsule, Take 5,000 Units by mouth Daily., Disp: , Rfl:   •  metoprolol tartrate (LOPRESSOR) 25 MG tablet, Take 0.5 tablets by mouth As Needed (tachycardia)., Disp: 10 tablet, Rfl: 1    Social History     Socioeconomic History   • Marital status:      Spouse name: Not on file   • Number of children: Not on file   • Years of education: Not on file   • Highest education level: Not on file   Tobacco Use   • Smoking status: Never Smoker   • Smokeless tobacco: Never Used   Vaping Use   • Vaping Use: Never used   Substance and Sexual Activity   • Alcohol use: Yes     Alcohol/week: 7.0 standard drinks     Types: 7 Shots of liquor per week   • Drug use: Never   • Sexual activity: Defer       Family History   Problem Relation Age of Onset   • No Known Problems Mother    • No Known Problems Father        The following portions of the patient's history were reviewed and updated as appropriate: allergies, current medications, past family history, past medical history, past social history, past surgical history and problem list.    Review of Systems   Constitutional: Negative for decreased appetite and diaphoresis.   HENT: Negative for congestion, hearing loss and nosebleeds.    Cardiovascular: Positive for irregular heartbeat and palpitations. Negative for chest pain, claudication, dyspnea on exertion, leg swelling, near-syncope, orthopnea, paroxysmal nocturnal dyspnea and syncope.   Respiratory: Negative for cough, shortness of breath and sleep disturbances due to breathing.    Endocrine: Negative for polyuria.  "  Hematologic/Lymphatic: Does not bruise/bleed easily.   Skin: Negative for itching and rash.   Musculoskeletal: Negative for back pain, muscle weakness and myalgias.   Gastrointestinal: Negative for abdominal pain, change in bowel habit and nausea.   Genitourinary: Negative for dysuria, flank pain, frequency and hesitancy.   Neurological: Negative for dizziness, tremors and weakness.   Psychiatric/Behavioral: Negative for altered mental status. The patient does not have insomnia.      /82   Pulse 85   Ht 167.6 cm (65.98\")   Wt 59 kg (130 lb)   BMI 20.99 kg/m² .  Objective     Vitals reviewed.   Constitutional:       General: Not in acute distress.     Appearance: Normal and healthy appearance. Well-developed and not in distress.   Eyes:      Pupils: Pupils are equal, round, and reactive to light.   HENT:      Head: Normocephalic and atraumatic.   Neck:      Vascular: No JVD.   Pulmonary:      Effort: Pulmonary effort is normal.      Breath sounds: Normal breath sounds.   Cardiovascular:      Normal rate. Regular rhythm.   Pulses:     Intact distal pulses.   Edema:     Peripheral edema absent.   Abdominal:      General: There is no distension.      Palpations: Abdomen is soft.      Tenderness: There is no abdominal tenderness.   Musculoskeletal: Normal range of motion.      Cervical back: Normal range of motion and neck supple. Skin:     General: Skin is warm and dry.   Neurological:      Mental Status: Alert, oriented to person, place, and time and oriented to person, place and time.           Procedures                 Diagnoses and all orders for this visit:    1. 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 Palpitations  Comments:  Recent recurrent palpitations with reported heart rate of 164 which prompted a visit to the ER.  Symptoms " subsided by the time she arrived and heart rate was in    Other orders  -     Cancel: ECG 12 Lead  -     metoprolol succinate XL (Toprol XL) 25 MG 24 hr tablet; Take 1 tablet daily  Dispense: 30 tablet; Refill: 11  -     metoprolol tartrate (LOPRESSOR) 25 MG tablet; Take 0.5 tablets by mouth As Needed (tachycardia).  Dispense: 10 tablet; Refill: 1           MEDICAL DECISION MAKING:        Patient had recurrent episodes of palpitations with reported heart rate up to 160.  She had taken an extra half of her Toprol-XL with improvement in the symptoms but it took some time.    In the emergency room her labs were unremarkable.  Her heart rate had returned to baseline.    At this time we have had a long discussion with the patient.  She would elect to start with conservative treatment.  We will increase her metoprolol XL to 25 mg daily.  She is currently taking one half a tablet or 12.5 mg daily.    Additionally she has been given a small quantity of metoprolol tartrate to take on an as-needed basis for feelings of palpitations or tachycardia she has been advised she can take half of the metoprolol tartrate 25 mg tablet.        We will have the patient return for follow-up with Dr. Bernal in a few months.  If she has any worsening symptoms have advised her to contact the office sooner

## 2022-03-14 ENCOUNTER — OFFICE VISIT (OUTPATIENT)
Dept: CARDIOLOGY | Facility: CLINIC | Age: 77
End: 2022-03-14

## 2022-03-14 VITALS
DIASTOLIC BLOOD PRESSURE: 72 MMHG | RESPIRATION RATE: 18 BRPM | WEIGHT: 128.8 LBS | SYSTOLIC BLOOD PRESSURE: 118 MMHG | HEART RATE: 75 BPM | HEIGHT: 66 IN | BODY MASS INDEX: 20.7 KG/M2

## 2022-03-14 DIAGNOSIS — R06.02 SHORTNESS OF BREATH: ICD-10-CM

## 2022-03-14 DIAGNOSIS — R00.2 PALPITATIONS: ICD-10-CM

## 2022-03-14 DIAGNOSIS — I47.1 PAROXYSMAL SVT (SUPRAVENTRICULAR TACHYCARDIA): Primary | ICD-10-CM

## 2022-03-14 DIAGNOSIS — R55 NEAR SYNCOPE: ICD-10-CM

## 2022-03-14 PROCEDURE — 99214 OFFICE O/P EST MOD 30 MIN: CPT | Performed by: INTERNAL MEDICINE

## 2022-04-02 NOTE — PROGRESS NOTES
Cardiology Clinic Note  Jj Myers MD, PhD    Subjective:     Encounter Date:03/14/2022      Patient ID: Mary Kay Montero is a 76 y.o. female.    Chief Complaint:  Chief Complaint   Patient presents with   • Follow-up       HPI:    I the pleasure to see this very pleasant 76-year-old female today in follow-up.  Blood pressure is 118/72 with heart rates in the 70s.  She has paroxysmal SVT and paroxysmal atrial fibrillation.  Echo and stress performed in 2020 were unremarkable.  Her CV therapies consist of atorvastatin metoprolol extended release and fish oil, no history of CAD no anginal chest pain no heart failure signs or symptoms, palpitations intermittently which are controlled.  No reversible ischemia with normal EF and work-up 2020.  No other complaints today.  Non-smoker nondiabetic    Regular rate and rhythm no rubs murmurs gallops  No heave no lift  Euvolemic  No carotid bruits or JVD  No clubbing cyanosis or edema  Radial pulses equal bilaterally  Normal cap refill    Assessment plan per my encounter  Paroxysmal SVT and paroxysmal atrial fibrillation  On anticoagulation  Recommend low-dose aspirin  Continue metoprolol XL 25 daily  Atorvastatin for primary prevention of CAD and hyperlipidemia  Normal blood pressures  Active healthy lifestyle recommended, no restrictions for her    Healthcare prevention for CAD discussed  Healthy lifestyle discussed  Palpitations controlled on metoprolol      See her back in 1 year    Historical data copied forward from previous encounters in EMR including the history, exam, and assessment/plan has been reviewed and is unchanged unless noted otherwise.    Cardiac medicines reviewed with risk, benefits, and necessity of each discussed.    Risk and benefit of cardiac testing reviewed including death heart attack stroke pain bleeding infection need for vascular /cardiovascular surgery were discussed and the patient     Objective:         /72 (BP Location: Left arm,  "Patient Position: Sitting)   Pulse 75   Resp 18   Ht 167.6 cm (66\")   Wt 58.4 kg (128 lb 12.8 oz)   BMI 20.79 kg/m²         The pleasure to be involved in this patient's cardiovascular care.  Please call with any questions or concerns  Jj Myers MD, PhD    Most recent EKG as reviewed and interpreted by me:  Procedures     Most recent echo as reviewed and interpreted by me:  Results for orders placed during the hospital encounter of 01/13/20    Transthoracic Echo Complete With Contrast if Necessary Per Protocol    Interpretation Summary  The aortic valve is trileaflet with adequate opening and coaptation  The mitral and tricuspid valves are grossly structurally normal and demonstrate satisfactory diastolic liver excursion  Pulmonic  valve is grossly normal  RV dimension systolic function within normal limits  LV chamber size wall thickness and global contractility are satisfactory; LVEF 65 to 70%  Left atrial chamber dimension is normal  Aortic root with is normal  No pericardial effusion, intracardiac mass or vegetation    Supplementary Doppler exam shows normal aortic and mitral valve flow velocities with no significant regurgitant jets  RVSP less than 10 mmHg    Interpretation:  Normal 2D M-mode and Doppler echo exam for age.      Most recent stress test as reviewed and interpreted by me:  Results for orders placed during the hospital encounter of 01/13/20    Stress Test With Myocardial Perfusion One Day    Interpretation Summary  · Left ventricular ejection fraction is hyperdynamic (Calculated EF > 70%).  · Myocardial perfusion imaging indicates a normal myocardial perfusion study with no evidence of ischemia.  · Impressions are consistent with a low risk study.  · There is no prior study available for comparison.  · Moderate risk for ischemic heart disease.  · Findings consistent with a normal ECG stress test.  · Diaphragmatic attenuation artifact is present.    Stress portion of this exam was " supervised by: Dianne Gonzalez NP    Negative stress Myoview exam for fixed reversible ischemic abnormalities with well-preserved global LV systolic function; LVEF greater than 70% though patient's exercise tolerance is poor.      Most recent cardiac catheterization as reviewed interpreted by me:  No results found for this or any previous visit.    The following portions of the patient's history were reviewed and updated as appropriate: allergies, current medications, past family history, past medical history, past social history, past surgical history and problem list.      ROS:  14 point review of systems negative except as mentioned above    Current Outpatient Medications:   •  atorvastatin (LIPITOR) 20 MG tablet, Take 20 mg by mouth Daily., Disp: , Rfl:   •  Biotin 5000 MCG capsule, Take 5,000 mcg by mouth Daily., Disp: , Rfl:   •  calcium citrate-vitamin d (CITRACAL) 200-250 MG-UNIT tablet tablet, Take 1 tablet by mouth 2 (Two) Times a Day., Disp: , Rfl:   •  metoprolol succinate XL (Toprol XL) 25 MG 24 hr tablet, Take 1 tablet daily, Disp: 30 tablet, Rfl: 11  •  Misc Natural Products (LUTEIN 20 PO), Take 20 mg by mouth Daily., Disp: , Rfl:   •  Omega-3 Fatty Acids (FISH OIL) 1000 MG capsule capsule, Take 600 mg by mouth 2 (Two) Times a Day With Meals., Disp: , Rfl:   •  vitamin D3 125 MCG (5000 UT) capsule capsule, Take 5,000 Units by mouth Daily., Disp: , Rfl:     Problem List:  Patient Active Problem List   Diagnosis   • Near syncope   • Palpitations   • Paroxysmal SVT (supraventricular tachycardia) (HCC)   • Shortness of breath   • Combined form of senile cataract   • Epiretinal membrane   • Macular drusen   • Presbyopia   • Central serous chorioretinopathy   • Macular retinal edema   • Exudative age-related macular degeneration (HCC)   • Nonexudative age-related macular degeneration     Past Medical History:  Past Medical History:   Diagnosis Date   • Hyperlipidemia    • SVT (supraventricular tachycardia)  (HCC)      Past Surgical History:  History reviewed. No pertinent surgical history.  Social History:  Social History     Socioeconomic History   • Marital status:    Tobacco Use   • Smoking status: Never Smoker   • Smokeless tobacco: Never Used   Vaping Use   • Vaping Use: Never used   Substance and Sexual Activity   • Alcohol use: Yes     Alcohol/week: 7.0 standard drinks     Types: 7 Shots of liquor per week   • Drug use: Never   • Sexual activity: Defer     Allergies:  No Known Allergies  Immunizations:  Immunization History   Administered Date(s) Administered   • FLUAD TRI 65YR+ 09/04/2019   • Fluad Quad 65+ 09/10/2020   • Fluzone High Dose =>65 Years ([employer] ONLY) 10/17/2014, 09/23/2015, 10/01/2016, 09/27/2017, 10/10/2018   • Hepatitis A 08/08/2018, 03/07/2019   • Influenza Quad Vaccine (Inpatient) 11/11/2013   • Influenza, Unspecified 11/12/2012   • Pneumococcal Conjugate 13-Valent (PCV13) 10/28/2015   • Shingrix 08/02/2018, 10/22/2018            In-Office Procedure(s):  No orders to display        ASCVD RIsk Score::  The 10-year ASCVD risk score (Leroy WES Jr., et al., 2013) is: 15.6%    Values used to calculate the score:      Age: 76 years      Sex: Female      Is Non- : No      Diabetic: No      Tobacco smoker: No      Systolic Blood Pressure: 118 mmHg      Is BP treated: No      HDL Cholesterol: 86 mg/dL      Total Cholesterol: 167 mg/dL    Imaging:    Results for orders placed during the hospital encounter of 06/02/21    XR Chest 1 View    Narrative  Examination: XR CHEST 1 VW-    Date of Exam: 6/2/2021 5:28 PM    Indication: Chest pain.    Comparison: 01/13/2020    Technique: 1 view of the chest    FINDINGS:  The heart size is within normal. There is borderline hyperinflation.  Mediastinal and hilar contours are unremarkable. There is no vascular  congestion, airspace opacity, or pulmonary nodule. Negative for pleural  effusion or pneumothorax. No bone abnormality is  seen.    Impression  1. There is a question of emphysema.  2. No acute process demonstrated.    Electronically Signed By-Sandee Lee MD On:6/2/2021 5:59 PM  This report was finalized on [id number] by  Sandee Lee MD.               Lab Review:   No visits with results within 6 Month(s) from this visit.   Latest known visit with results is:   Admission on 06/02/2021, Discharged on 06/02/2021   Component Date Value   • QT Interval 06/02/2021 370    • Glucose 06/02/2021 94    • BUN 06/02/2021 10    • Creatinine 06/02/2021 0.84    • Sodium 06/02/2021 139    • Potassium 06/02/2021 3.6    • Chloride 06/02/2021 99    • CO2 06/02/2021 26.0    • Calcium 06/02/2021 9.2    • eGFR Non  Amer 06/02/2021 66    • BUN/Creatinine Ratio 06/02/2021 11.9    • Anion Gap 06/02/2021 14.0    • Troponin T 06/02/2021 <0.010    • WBC 06/02/2021 6.80    • RBC 06/02/2021 4.51    • Hemoglobin 06/02/2021 15.0    • Hematocrit 06/02/2021 43.3    • MCV 06/02/2021 96.2    • MCH 06/02/2021 33.2 (A)   • MCHC 06/02/2021 34.5    • RDW 06/02/2021 13.2    • RDW-SD 06/02/2021 43.8    • MPV 06/02/2021 7.7    • Platelets 06/02/2021 248    • Neutrophil % 06/02/2021 58.5    • Lymphocyte % 06/02/2021 33.5    • Monocyte % 06/02/2021 6.9    • Eosinophil % 06/02/2021 0.8    • Basophil % 06/02/2021 0.3    • Neutrophils, Absolute 06/02/2021 4.00    • Lymphocytes, Absolute 06/02/2021 2.30    • Monocytes, Absolute 06/02/2021 0.50    • Eosinophils, Absolute 06/02/2021 0.10    • Basophils, Absolute 06/02/2021 0.00    • nRBC 06/02/2021 0.1      Recent labs reviewed and interpreted for clinical significance and application            Level of Care:           Jj Myers MD  04/02/22  .

## 2022-06-15 RX ORDER — METOPROLOL SUCCINATE 25 MG/1
TABLET, EXTENDED RELEASE ORAL
Qty: 30 TABLET | Refills: 5 | Status: SHIPPED | OUTPATIENT
Start: 2022-06-15 | End: 2022-12-12

## 2022-12-12 RX ORDER — METOPROLOL SUCCINATE 25 MG/1
TABLET, EXTENDED RELEASE ORAL
Qty: 30 TABLET | Refills: 5 | Status: SHIPPED | OUTPATIENT
Start: 2022-12-12 | End: 2023-03-13 | Stop reason: SDUPTHER

## 2023-03-13 ENCOUNTER — OFFICE VISIT (OUTPATIENT)
Dept: CARDIOLOGY | Facility: CLINIC | Age: 78
End: 2023-03-13
Payer: MEDICARE

## 2023-03-13 VITALS
DIASTOLIC BLOOD PRESSURE: 78 MMHG | HEART RATE: 70 BPM | WEIGHT: 126 LBS | HEIGHT: 66 IN | RESPIRATION RATE: 18 BRPM | BODY MASS INDEX: 20.25 KG/M2 | SYSTOLIC BLOOD PRESSURE: 131 MMHG

## 2023-03-13 DIAGNOSIS — R00.2 PALPITATIONS: Primary | ICD-10-CM

## 2023-03-13 PROCEDURE — 93000 ELECTROCARDIOGRAM COMPLETE: CPT | Performed by: INTERNAL MEDICINE

## 2023-03-13 PROCEDURE — 99214 OFFICE O/P EST MOD 30 MIN: CPT | Performed by: INTERNAL MEDICINE

## 2023-03-13 RX ORDER — METOPROLOL SUCCINATE 25 MG/1
25 TABLET, EXTENDED RELEASE ORAL DAILY
Qty: 90 TABLET | Refills: 3 | Status: SHIPPED | OUTPATIENT
Start: 2023-03-13

## 2023-03-13 RX ORDER — DONEPEZIL HYDROCHLORIDE 5 MG/1
TABLET, FILM COATED ORAL DAILY
COMMUNITY
Start: 2023-02-16

## 2023-03-13 NOTE — PROGRESS NOTES
Cardiology Clinic Note  Jj Myers MD, PhD    Subjective:     Encounter Date:03/13/2023      Patient ID: Mary Kay Montero is a 77 y.o. female.    Chief Complaint:  Chief Complaint   Patient presents with   • Follow-up       HPI:     I the pleasure to see this very pleasant 77-year-old female today in follow-up.  Blood pressure is 118/72 with heart rates in the 70s.  She has paroxysmal SVT and paroxysmal atrial fibrillation.  Echo and stress performed in 2020 were unremarkable.  Her CV therapies consist of atorvastatin metoprolol extended release and fish oil, no history of CAD no anginal chest pain no heart failure signs or symptoms, palpitations intermittently which are controlled.  No reversible ischemia with normal EF and work-up 2020 without hospitalization cardiac concerns since this time.    Euvolemic well compensated, Non-smoker nondiabetic, she is without complaints today on review follow-up       Blood pressure 131/78 with heart rate of 70, weight is 126 stable  Regular rate and rhythm no rubs murmurs gallops  No heave no lift  Euvolemic  No carotid bruits or JVD  No clubbing cyanosis or edema  Radial pulses equal bilaterally  Normal cap refill  Unchanged from prior     Assessment plan per my encounter  Paroxysmal SVT and paroxysmal atrial fibrillation  On anticoagulation  Recommend low-dose aspirin  Continue metoprolol XL 25 daily  Atorvastatin for primary prevention of CAD and hyperlipidemia  Normal blood pressures  Active healthy lifestyle recommended, no restrictions for her     Healthcare prevention for CAD discussed  Healthy lifestyle discussed  Palpitations controlled on metoprolol        See her back in 1 year      Historical data copied forward from previous encounters in EMR including the history, exam, and assessment/plan has been reviewed and is unchanged unless noted otherwise.    Cardiac medicines reviewed with risk, benefits, and necessity of each discussed.    Risk and benefit of cardiac  "testing reviewed including death heart attack stroke pain bleeding infection need for vascular /cardiovascular surgery were discussed and the patient     Objective:         /78 (BP Location: Left arm, Patient Position: Sitting)   Pulse 70   Resp 18   Ht 167.6 cm (66\")   Wt 57.2 kg (126 lb)   BMI 20.34 kg/m²         The pleasure to be involved in this patient's cardiovascular care.  Please call with any questions or concerns  Jj Myers MD, PhD    Most recent EKG as reviewed and interpreted by me:    ECG 12 Lead    Date/Time: 3/13/2023 1:50 PM  Performed by: Jj Myers MD  Authorized by: Jj Myers MD   Comparison: not compared with previous ECG   Previous ECG: no previous ECG available  Rhythm: sinus rhythm  Rate: normal  Conduction: conduction normal  QRS axis: normal  Other findings: non-specific ST-T wave changes             Most recent echo as reviewed and interpreted by me:  Results for orders placed during the hospital encounter of 01/13/20    Transthoracic Echo Complete With Contrast if Necessary Per Protocol    Interpretation Summary  The aortic valve is trileaflet with adequate opening and coaptation  The mitral and tricuspid valves are grossly structurally normal and demonstrate satisfactory diastolic liver excursion  Pulmonic  valve is grossly normal  RV dimension systolic function within normal limits  LV chamber size wall thickness and global contractility are satisfactory; LVEF 65 to 70%  Left atrial chamber dimension is normal  Aortic root with is normal  No pericardial effusion, intracardiac mass or vegetation    Supplementary Doppler exam shows normal aortic and mitral valve flow velocities with no significant regurgitant jets  RVSP less than 10 mmHg    Interpretation:  Normal 2D M-mode and Doppler echo exam for age.      Most recent stress test as reviewed and interpreted by me:  Results for orders placed during the hospital encounter of 01/13/20    Stress " Test With Myocardial Perfusion One Day    Interpretation Summary  · Left ventricular ejection fraction is hyperdynamic (Calculated EF > 70%).  · Myocardial perfusion imaging indicates a normal myocardial perfusion study with no evidence of ischemia.  · Impressions are consistent with a low risk study.  · There is no prior study available for comparison.  · Moderate risk for ischemic heart disease.  · Findings consistent with a normal ECG stress test.  · Diaphragmatic attenuation artifact is present.    Stress portion of this exam was supervised by: Dianne Gonzalez NP    Negative stress Myoview exam for fixed reversible ischemic abnormalities with well-preserved global LV systolic function; LVEF greater than 70% though patient's exercise tolerance is poor.      Most recent cardiac catheterization as reviewed interpreted by me:  No results found for this or any previous visit.    The following portions of the patient's history were reviewed and updated as appropriate: allergies, current medications, past family history, past medical history, past social history, past surgical history and problem list.      ROS:  14 point review of systems negative except as mentioned above    Current Outpatient Medications:   •  Biotin 5000 MCG capsule, Take 5,000 mcg by mouth Daily., Disp: , Rfl:   •  calcium citrate-vitamin d (CITRACAL) 200-250 MG-UNIT tablet tablet, Take 1 tablet by mouth 2 (Two) Times a Day., Disp: , Rfl:   •  donepezil (ARICEPT) 5 MG tablet, Daily., Disp: , Rfl:   •  metoprolol succinate XL (TOPROL-XL) 25 MG 24 hr tablet, TAKE ONE TABLET BY MOUTH DAILY, Disp: 30 tablet, Rfl: 5  •  Misc Natural Products (LUTEIN 20 PO), Take 20 mg by mouth Daily., Disp: , Rfl:   •  Omega-3 Fatty Acids (FISH OIL) 1000 MG capsule capsule, Take 600 mg by mouth 2 (Two) Times a Day With Meals., Disp: , Rfl:   •  vitamin D3 125 MCG (5000 UT) capsule capsule, Take 1 capsule by mouth Daily., Disp: , Rfl:     Problem List:  Patient Active  Problem List   Diagnosis   • Near syncope   • Palpitations   • Paroxysmal SVT (supraventricular tachycardia) (HCC)   • Shortness of breath   • Combined form of senile cataract   • Epiretinal membrane   • Macular drusen   • Presbyopia   • Central serous chorioretinopathy   • Macular retinal edema   • Exudative age-related macular degeneration (HCC)   • Nonexudative age-related macular degeneration     Past Medical History:  Past Medical History:   Diagnosis Date   • Hyperlipidemia    • SVT (supraventricular tachycardia) (HCC)      Past Surgical History:  No past surgical history on file.  Social History:  Social History     Socioeconomic History   • Marital status:    Tobacco Use   • Smoking status: Never   • Smokeless tobacco: Never   Vaping Use   • Vaping Use: Never used   Substance and Sexual Activity   • Alcohol use: Yes     Alcohol/week: 7.0 standard drinks     Types: 7 Shots of liquor per week   • Drug use: Never   • Sexual activity: Defer     Allergies:  No Known Allergies  Immunizations:  Immunization History   Administered Date(s) Administered   • FLUAD TRI 65YR+ 09/04/2019   • Fluad Quad 65+ 09/10/2020   • Fluzone High Dose =>65 Years ([employer] ONLY) 10/17/2014, 09/23/2015, 10/01/2016, 09/27/2017, 10/10/2018   • Hepatitis A 08/08/2018, 03/07/2019   • Influenza Quad Vaccine (Inpatient) 11/11/2013   • Influenza, Unspecified 11/12/2012   • Pneumococcal Conjugate 13-Valent (PCV13) 10/28/2015   • Shingrix 08/02/2018, 10/22/2018            In-Office Procedure(s):  No orders to display        ASCVD RIsk Score::  The ASCVD Risk score (Hood DK, et al., 2019) failed to calculate for the following reasons:    Cannot find a previous HDL lab    Cannot find a previous total cholesterol lab    Imaging:    Results for orders placed during the hospital encounter of 06/02/21    XR Chest 1 View    Narrative  Examination: XR CHEST 1 VW-    Date of Exam: 6/2/2021 5:28 PM    Indication: Chest pain.    Comparison:  01/13/2020    Technique: 1 view of the chest    FINDINGS:  The heart size is within normal. There is borderline hyperinflation.  Mediastinal and hilar contours are unremarkable. There is no vascular  congestion, airspace opacity, or pulmonary nodule. Negative for pleural  effusion or pneumothorax. No bone abnormality is seen.    Impression  1. There is a question of emphysema.  2. No acute process demonstrated.    Electronically Signed By-Sandee Lee MD On:6/2/2021 5:59 PM  This report was finalized on [id number] by  Sandee Lee MD.               Lab Review:   No visits with results within 6 Month(s) from this visit.   Latest known visit with results is:   Admission on 06/02/2021, Discharged on 06/02/2021   Component Date Value   • QT Interval 06/02/2021 370    • Glucose 06/02/2021 94    • BUN 06/02/2021 10    • Creatinine 06/02/2021 0.84    • Sodium 06/02/2021 139    • Potassium 06/02/2021 3.6    • Chloride 06/02/2021 99    • CO2 06/02/2021 26.0    • Calcium 06/02/2021 9.2    • eGFR Non  Amer 06/02/2021 66    • BUN/Creatinine Ratio 06/02/2021 11.9    • Anion Gap 06/02/2021 14.0    • Troponin T 06/02/2021 <0.010    • WBC 06/02/2021 6.80    • RBC 06/02/2021 4.51    • Hemoglobin 06/02/2021 15.0    • Hematocrit 06/02/2021 43.3    • MCV 06/02/2021 96.2    • MCH 06/02/2021 33.2 (H)    • MCHC 06/02/2021 34.5    • RDW 06/02/2021 13.2    • RDW-SD 06/02/2021 43.8    • MPV 06/02/2021 7.7    • Platelets 06/02/2021 248    • Neutrophil % 06/02/2021 58.5    • Lymphocyte % 06/02/2021 33.5    • Monocyte % 06/02/2021 6.9    • Eosinophil % 06/02/2021 0.8    • Basophil % 06/02/2021 0.3    • Neutrophils, Absolute 06/02/2021 4.00    • Lymphocytes, Absolute 06/02/2021 2.30    • Monocytes, Absolute 06/02/2021 0.50    • Eosinophils, Absolute 06/02/2021 0.10    • Basophils, Absolute 06/02/2021 0.00    • nRBC 06/02/2021 0.1      Recent labs reviewed and interpreted for clinical significance and application            Level of  Care:           Jj Myers MD  03/13/23  .

## 2023-04-20 ENCOUNTER — OFFICE (OUTPATIENT)
Dept: URBAN - METROPOLITAN AREA PATHOLOGY 4 | Facility: PATHOLOGY | Age: 78
End: 2023-04-20

## 2023-04-20 ENCOUNTER — OFFICE (OUTPATIENT)
Dept: URBAN - METROPOLITAN AREA PATHOLOGY 4 | Facility: PATHOLOGY | Age: 78
End: 2023-04-20
Payer: COMMERCIAL

## 2023-04-20 ENCOUNTER — ON CAMPUS - OUTPATIENT (OUTPATIENT)
Dept: URBAN - METROPOLITAN AREA HOSPITAL 2 | Facility: HOSPITAL | Age: 78
End: 2023-04-20

## 2023-04-20 VITALS
OXYGEN SATURATION: 97 % | SYSTOLIC BLOOD PRESSURE: 99 MMHG | DIASTOLIC BLOOD PRESSURE: 56 MMHG | WEIGHT: 119 LBS | DIASTOLIC BLOOD PRESSURE: 78 MMHG | HEART RATE: 66 BPM | DIASTOLIC BLOOD PRESSURE: 66 MMHG | SYSTOLIC BLOOD PRESSURE: 94 MMHG | OXYGEN SATURATION: 100 % | SYSTOLIC BLOOD PRESSURE: 138 MMHG | DIASTOLIC BLOOD PRESSURE: 85 MMHG | HEART RATE: 70 BPM | SYSTOLIC BLOOD PRESSURE: 128 MMHG | OXYGEN SATURATION: 98 % | DIASTOLIC BLOOD PRESSURE: 77 MMHG | DIASTOLIC BLOOD PRESSURE: 80 MMHG | SYSTOLIC BLOOD PRESSURE: 114 MMHG | SYSTOLIC BLOOD PRESSURE: 126 MMHG | HEIGHT: 65 IN | HEART RATE: 61 BPM | DIASTOLIC BLOOD PRESSURE: 53 MMHG | HEART RATE: 78 BPM | TEMPERATURE: 97.8 F | HEART RATE: 63 BPM | RESPIRATION RATE: 18 BRPM | HEART RATE: 68 BPM | OXYGEN SATURATION: 96 % | DIASTOLIC BLOOD PRESSURE: 65 MMHG | RESPIRATION RATE: 17 BRPM | OXYGEN SATURATION: 99 % | SYSTOLIC BLOOD PRESSURE: 123 MMHG

## 2023-04-20 DIAGNOSIS — K52.9 NONINFECTIVE GASTROENTERITIS AND COLITIS, UNSPECIFIED: ICD-10-CM

## 2023-04-20 DIAGNOSIS — K57.30 DIVERTICULOSIS OF LARGE INTESTINE WITHOUT PERFORATION OR ABS: ICD-10-CM

## 2023-04-20 DIAGNOSIS — Z12.11 ENCOUNTER FOR SCREENING FOR MALIGNANT NEOPLASM OF COLON: ICD-10-CM

## 2023-04-20 PROBLEM — K63.89 OTHER SPECIFIED DISEASES OF INTESTINE: Status: ACTIVE | Noted: 2023-04-20

## 2023-04-20 LAB
GI HISTOLOGY: A. UNSPECIFIED: (no result)
GI HISTOLOGY: PDF REPORT: (no result)

## 2023-04-20 PROCEDURE — 88305 TISSUE EXAM BY PATHOLOGIST: CPT | Mod: 26 | Performed by: INTERNAL MEDICINE

## 2023-04-20 PROCEDURE — 45380 COLONOSCOPY AND BIOPSY: CPT | Mod: PT | Performed by: INTERNAL MEDICINE

## 2023-07-24 ENCOUNTER — OFFICE (OUTPATIENT)
Dept: URBAN - METROPOLITAN AREA CLINIC 64 | Facility: CLINIC | Age: 78
End: 2023-07-24

## 2023-07-24 VITALS
SYSTOLIC BLOOD PRESSURE: 141 MMHG | WEIGHT: 124 LBS | HEIGHT: 65 IN | DIASTOLIC BLOOD PRESSURE: 86 MMHG | HEART RATE: 67 BPM

## 2023-07-24 DIAGNOSIS — K51.50 LEFT SIDED COLITIS WITHOUT COMPLICATIONS: ICD-10-CM

## 2023-07-24 PROCEDURE — 99214 OFFICE O/P EST MOD 30 MIN: CPT | Performed by: INTERNAL MEDICINE

## 2023-10-13 ENCOUNTER — OFFICE (OUTPATIENT)
Dept: URBAN - METROPOLITAN AREA CLINIC 64 | Facility: CLINIC | Age: 78
End: 2023-10-13

## 2023-10-13 VITALS
SYSTOLIC BLOOD PRESSURE: 124 MMHG | WEIGHT: 122 LBS | HEIGHT: 65 IN | HEART RATE: 65 BPM | DIASTOLIC BLOOD PRESSURE: 79 MMHG

## 2023-10-13 DIAGNOSIS — R19.7 DIARRHEA, UNSPECIFIED: ICD-10-CM

## 2023-10-13 DIAGNOSIS — K51.50 LEFT SIDED COLITIS WITHOUT COMPLICATIONS: ICD-10-CM

## 2023-10-13 PROCEDURE — 99214 OFFICE O/P EST MOD 30 MIN: CPT | Performed by: INTERNAL MEDICINE

## 2023-10-30 ENCOUNTER — OFFICE (OUTPATIENT)
Dept: URBAN - METROPOLITAN AREA INFUSION 3 | Facility: INFUSION | Age: 78
End: 2023-10-30

## 2023-10-30 VITALS
DIASTOLIC BLOOD PRESSURE: 61 MMHG | RESPIRATION RATE: 18 BRPM | DIASTOLIC BLOOD PRESSURE: 62 MMHG | HEART RATE: 57 BPM | SYSTOLIC BLOOD PRESSURE: 118 MMHG | HEIGHT: 65 IN | SYSTOLIC BLOOD PRESSURE: 144 MMHG | TEMPERATURE: 98 F | HEART RATE: 65 BPM | SYSTOLIC BLOOD PRESSURE: 131 MMHG | TEMPERATURE: 97.7 F | DIASTOLIC BLOOD PRESSURE: 75 MMHG | DIASTOLIC BLOOD PRESSURE: 65 MMHG | SYSTOLIC BLOOD PRESSURE: 125 MMHG | HEART RATE: 59 BPM | TEMPERATURE: 98.2 F

## 2023-10-30 DIAGNOSIS — K51.50 LEFT SIDED COLITIS WITHOUT COMPLICATIONS: ICD-10-CM

## 2023-10-30 PROCEDURE — 96365 THER/PROPH/DIAG IV INF INIT: CPT | Performed by: INTERNAL MEDICINE

## 2023-10-30 NOTE — SERVICENOTES
MEDS PROVIDED BY [facility]
NEGATIVE PPD or Quantiferon Gold:  Annual 9/2023
CBC/CMP/CRP done today

## 2023-11-14 ENCOUNTER — OFFICE (OUTPATIENT)
Dept: URBAN - METROPOLITAN AREA INFUSION 3 | Facility: INFUSION | Age: 78
End: 2023-11-14

## 2023-11-14 VITALS
TEMPERATURE: 98.1 F | HEART RATE: 64 BPM | SYSTOLIC BLOOD PRESSURE: 130 MMHG | SYSTOLIC BLOOD PRESSURE: 137 MMHG | RESPIRATION RATE: 16 BRPM | RESPIRATION RATE: 17 BRPM | DIASTOLIC BLOOD PRESSURE: 66 MMHG | HEART RATE: 62 BPM | DIASTOLIC BLOOD PRESSURE: 65 MMHG | HEART RATE: 60 BPM | HEIGHT: 65 IN | TEMPERATURE: 98.6 F | SYSTOLIC BLOOD PRESSURE: 125 MMHG | DIASTOLIC BLOOD PRESSURE: 73 MMHG | SYSTOLIC BLOOD PRESSURE: 120 MMHG | DIASTOLIC BLOOD PRESSURE: 63 MMHG

## 2023-11-14 DIAGNOSIS — K51.50 LEFT SIDED COLITIS WITHOUT COMPLICATIONS: ICD-10-CM

## 2023-11-14 PROCEDURE — 96365 THER/PROPH/DIAG IV INF INIT: CPT | Performed by: INTERNAL MEDICINE

## 2023-11-14 NOTE — SERVICENOTES
NEGATIVE PPD or Quantiferon Gold: 9/2023
MEDS PROVIDED BY [facility]
cbc cmp crp obtained prior to infusion

## 2023-12-12 ENCOUNTER — OFFICE (OUTPATIENT)
Dept: URBAN - METROPOLITAN AREA INFUSION 3 | Facility: INFUSION | Age: 78
End: 2023-12-12

## 2023-12-12 VITALS
HEART RATE: 60 BPM | RESPIRATION RATE: 18 BRPM | DIASTOLIC BLOOD PRESSURE: 57 MMHG | DIASTOLIC BLOOD PRESSURE: 72 MMHG | SYSTOLIC BLOOD PRESSURE: 111 MMHG | TEMPERATURE: 97.4 F | HEART RATE: 58 BPM | TEMPERATURE: 97.6 F | HEART RATE: 63 BPM | HEIGHT: 65 IN | SYSTOLIC BLOOD PRESSURE: 112 MMHG | DIASTOLIC BLOOD PRESSURE: 56 MMHG | DIASTOLIC BLOOD PRESSURE: 59 MMHG | SYSTOLIC BLOOD PRESSURE: 132 MMHG

## 2023-12-12 DIAGNOSIS — K51.50 LEFT SIDED COLITIS WITHOUT COMPLICATIONS: ICD-10-CM

## 2023-12-12 PROCEDURE — 96365 THER/PROPH/DIAG IV INF INIT: CPT | Performed by: INTERNAL MEDICINE

## 2023-12-12 NOTE — SERVICENOTES
MEDS PROVIDED BY [facility]
NEGATIVE PPD or Quantiferon Gold:9/2023
CBC,CMP,CRP,Anser VDZ obtained today prior to infusion.

## 2024-01-29 ENCOUNTER — OFFICE (OUTPATIENT)
Dept: URBAN - METROPOLITAN AREA CLINIC 64 | Facility: CLINIC | Age: 79
End: 2024-01-29

## 2024-01-29 VITALS
HEART RATE: 66 BPM | HEIGHT: 65 IN | DIASTOLIC BLOOD PRESSURE: 88 MMHG | SYSTOLIC BLOOD PRESSURE: 135 MMHG | WEIGHT: 118 LBS

## 2024-01-29 DIAGNOSIS — Z79.899 OTHER LONG TERM (CURRENT) DRUG THERAPY: ICD-10-CM

## 2024-01-29 DIAGNOSIS — K51.50 LEFT SIDED COLITIS WITHOUT COMPLICATIONS: ICD-10-CM

## 2024-01-29 DIAGNOSIS — R19.7 DIARRHEA, UNSPECIFIED: ICD-10-CM

## 2024-01-29 PROCEDURE — 99214 OFFICE O/P EST MOD 30 MIN: CPT | Performed by: INTERNAL MEDICINE

## 2024-02-06 ENCOUNTER — OFFICE (OUTPATIENT)
Dept: URBAN - METROPOLITAN AREA INFUSION 3 | Facility: INFUSION | Age: 79
End: 2024-02-06

## 2024-02-06 VITALS
DIASTOLIC BLOOD PRESSURE: 64 MMHG | SYSTOLIC BLOOD PRESSURE: 114 MMHG | HEART RATE: 60 BPM | SYSTOLIC BLOOD PRESSURE: 116 MMHG | DIASTOLIC BLOOD PRESSURE: 63 MMHG | TEMPERATURE: 98.1 F | TEMPERATURE: 98.7 F | RESPIRATION RATE: 17 BRPM | DIASTOLIC BLOOD PRESSURE: 56 MMHG | RESPIRATION RATE: 16 BRPM | HEIGHT: 65 IN | WEIGHT: 117.8 LBS | HEART RATE: 67 BPM | HEART RATE: 59 BPM | SYSTOLIC BLOOD PRESSURE: 128 MMHG

## 2024-02-06 DIAGNOSIS — K51.50 LEFT SIDED COLITIS WITHOUT COMPLICATIONS: ICD-10-CM

## 2024-02-06 PROCEDURE — 96365 THER/PROPH/DIAG IV INF INIT: CPT | Performed by: INTERNAL MEDICINE

## 2024-03-12 ENCOUNTER — OFFICE VISIT (OUTPATIENT)
Dept: CARDIOLOGY | Facility: CLINIC | Age: 79
End: 2024-03-12
Payer: MEDICARE

## 2024-03-12 VITALS
WEIGHT: 119 LBS | RESPIRATION RATE: 18 BRPM | BODY MASS INDEX: 19.13 KG/M2 | HEART RATE: 66 BPM | SYSTOLIC BLOOD PRESSURE: 131 MMHG | HEIGHT: 66 IN | DIASTOLIC BLOOD PRESSURE: 78 MMHG

## 2024-03-12 DIAGNOSIS — I48.0 PAROXYSMAL ATRIAL FIBRILLATION: Primary | ICD-10-CM

## 2024-03-12 DIAGNOSIS — Z00.00 PREVENTATIVE HEALTH CARE: ICD-10-CM

## 2024-03-12 PROCEDURE — 93000 ELECTROCARDIOGRAM COMPLETE: CPT | Performed by: INTERNAL MEDICINE

## 2024-03-12 RX ORDER — SERTRALINE HYDROCHLORIDE 25 MG/1
25 TABLET, FILM COATED ORAL DAILY
COMMUNITY
Start: 2024-03-01

## 2024-03-12 RX ORDER — MULTIPLE VITAMINS W/ MINERALS TAB 9MG-400MCG
1 TAB ORAL DAILY
COMMUNITY

## 2024-03-12 RX ORDER — ASPIRIN 81 MG/1
81 TABLET ORAL DAILY
Qty: 90 TABLET | Refills: 3 | Status: SHIPPED | OUTPATIENT
Start: 2024-03-12

## 2024-03-12 NOTE — PROGRESS NOTES
Cardiology Clinic Note  Jj Myers MD, PhD    Subjective:     Encounter Date:03/12/2024      Patient ID: Mary Kay Montero is a 78 y.o. female.    Chief Complaint:  Chief Complaint   Patient presents with    Follow-up       HPI:      I the pleasure to see this very pleasant 78-year-old female today in follow-up.  She is followed by cardiology for paroxysmal SVT and paroxysmal atrial fibrillation.  Echo and stress performed in 2020 were unremarkable.  Her CV therapies consist of atorvastatin metoprolol extended release and fish oil, no history of CAD no anginal chest pain no heart failure signs or symptoms, palpitations intermittently which are controlled.  No reversible ischemia with normal EF and work-up 2020 without hospitalization cardiac concerns since this time.    Euvolemic well compensated, Non-smoker nondiabetic, she is without complaints today on review follow-up today otherwise doing well symptomatically.  We discussed aspirin daily as she is off anticoagulation.  We discussed unexplained weight loss that she has inflammatory bowel disease with some food aversion and some weight loss.  We discussed avoiding frailty as she gets older.  Discussed daily aspirin with history of paroxysmal atrial fibrillation now again off anticoagulation.  We also discussed calcium scoring which she has not had in the past.    Review of systems otherwise negative x 14 point review of systems except as mentioned above  Historical data copied forward from previous encounters in EMR is unchanged    Indication for an ischemic evaluation at this time asymptomatic with no chest pain no heart failure signs or symptoms        Vitals reviewed in EMR  Regular rate and rhythm no rubs murmurs gallops  No heave no lift  Euvolemic  No carotid bruits or JVD  No clubbing cyanosis or edema  Radial pulses equal bilaterally  Normal cap refill  Unchanged from prior     Assessment plan per my encounter  Paroxysmal SVT and paroxysmal atrial  "fibrillation  On anticoagulation  Recommend low-dose aspirin  Continue metoprolol XL 25 daily  Atorvastatin for primary prevention of CAD and hyperlipidemia  Normal blood pressures  Active healthy lifestyle recommended, no restrictions for her  Calcium scoring ordered    Continue present CV treatment plan  Healthcare prevention for CAD discussed  Healthy lifestyle discussed  Palpitations controlled on metoprolol        See her back in 1 year, sooner if needed    Objective:         /78 (BP Location: Right arm, Patient Position: Sitting)   Pulse 66   Resp 18   Ht 167.6 cm (66\")   Wt 54 kg (119 lb)   BMI 19.21 kg/m²     Physical Exam    Assessment:         There are no diagnoses linked to this encounter.       Plan:              The pleasure to be involved in this patient's cardiovascular care.  Please call with any questions or concerns  Jj Myers MD, PhD    Most recent EKG as reviewed and interpreted by me:    ECG 12 Lead    Date/Time: 3/12/2024 1:30 PM  Performed by: Jj Myers MD    Authorized by: Jj Myers MD  Comparison: not compared with previous ECG   Previous ECG: no previous ECG available  Rhythm: sinus rhythm  Rate: normal  Conduction: conduction normal  QRS axis: normal  Other findings: non-specific ST-T wave changes    Clinical impression: non-specific ECG           Most recent echo as reviewed and interpreted by me:  Results for orders placed during the hospital encounter of 01/13/20    Transthoracic Echo Complete With Contrast if Necessary Per Protocol    Interpretation Summary  The aortic valve is trileaflet with adequate opening and coaptation  The mitral and tricuspid valves are grossly structurally normal and demonstrate satisfactory diastolic liver excursion  Pulmonic  valve is grossly normal  RV dimension systolic function within normal limits  LV chamber size wall thickness and global contractility are satisfactory; LVEF 65 to 70%  Left atrial chamber " dimension is normal  Aortic root with is normal  No pericardial effusion, intracardiac mass or vegetation    Supplementary Doppler exam shows normal aortic and mitral valve flow velocities with no significant regurgitant jets  RVSP less than 10 mmHg    Interpretation:  Normal 2D M-mode and Doppler echo exam for age.      Most recent stress test as reviewed and interpreted by me:  Results for orders placed during the hospital encounter of 01/13/20    Stress Test With Myocardial Perfusion One Day    Interpretation Summary  · Left ventricular ejection fraction is hyperdynamic (Calculated EF > 70%).  · Myocardial perfusion imaging indicates a normal myocardial perfusion study with no evidence of ischemia.  · Impressions are consistent with a low risk study.  · There is no prior study available for comparison.  · Moderate risk for ischemic heart disease.  · Findings consistent with a normal ECG stress test.  · Diaphragmatic attenuation artifact is present.    Stress portion of this exam was supervised by: Dianne Gonzalez NP    Negative stress Myoview exam for fixed reversible ischemic abnormalities with well-preserved global LV systolic function; LVEF greater than 70% though patient's exercise tolerance is poor.      Most recent cardiac catheterization as reviewed interpreted by me:  No results found for this or any previous visit.    The following portions of the patient's history were reviewed and updated as appropriate: allergies, current medications, past family history, past medical history, past social history, past surgical history, and problem list.      ROS:  14 point review of systems negative except as mentioned above    Current Outpatient Medications:     Biotin 5000 MCG capsule, Take 5,000 mcg by mouth Daily., Disp: , Rfl:     metoprolol succinate XL (TOPROL-XL) 25 MG 24 hr tablet, Take 1 tablet by mouth Daily., Disp: 90 tablet, Rfl: 3    Misc Natural Products (LUTEIN 20 PO), Take 20 mg by mouth Daily.,  Disp: , Rfl:     multivitamin with minerals tablet tablet, Take 1 tablet by mouth Daily., Disp: , Rfl:     Omega-3 Fatty Acids (FISH OIL) 1000 MG capsule capsule, Take 600 mg by mouth 2 (Two) Times a Day With Meals., Disp: , Rfl:     sertraline (ZOLOFT) 25 MG tablet, Take 1 tablet by mouth Daily., Disp: , Rfl:     Vedolizumab (ENTYVIO IV), Infuse  into a venous catheter. Every 8 weeks, Disp: , Rfl:     vitamin D3 125 MCG (5000 UT) capsule capsule, Take 1 capsule by mouth Daily., Disp: , Rfl:     Problem List:  Patient Active Problem List   Diagnosis    Near syncope    Palpitations    Paroxysmal SVT (supraventricular tachycardia)    Shortness of breath    Combined form of senile cataract    Epiretinal membrane    Macular drusen    Presbyopia    Central serous chorioretinopathy    Macular retinal edema    Exudative age-related macular degeneration    Nonexudative age-related macular degeneration     Past Medical History:  Past Medical History:   Diagnosis Date    Hyperlipidemia     SVT (supraventricular tachycardia)      Past Surgical History:  No past surgical history on file.  Social History:  Social History     Socioeconomic History    Marital status:    Tobacco Use    Smoking status: Never    Smokeless tobacco: Never   Vaping Use    Vaping status: Never Used   Substance and Sexual Activity    Alcohol use: Yes     Alcohol/week: 7.0 standard drinks of alcohol     Types: 7 Shots of liquor per week    Drug use: Never    Sexual activity: Defer     Allergies:  No Known Allergies  Immunizations:  Immunization History   Administered Date(s) Administered    FLUAD TRI 65YR+ 09/04/2019    Fluad Quad 65+ 09/10/2020    Fluzone High Dose =>65 Years ([employer] ONLY) 10/17/2014, 09/23/2015, 10/01/2016, 09/27/2017, 10/10/2018    Hepatitis A 08/08/2018, 03/07/2019    Influenza Quad Vaccine (Inpatient) 11/11/2013    Influenza, Unspecified 11/12/2012    Pneumococcal Conjugate 13-Valent (PCV13) 10/28/2015    Shingrix 08/02/2018,  10/22/2018            In-Office Procedure(s):  No orders to display        ASCVD RIsk Score::  The ASCVD Risk score (Gayla DIETRICH, et al., 2019) failed to calculate for the following reasons:    Cannot find a previous HDL lab    Cannot find a previous total cholesterol lab    Imaging:    Results for orders placed during the hospital encounter of 06/02/21    XR Chest 1 View    Narrative  Examination: XR CHEST 1 VW-    Date of Exam: 6/2/2021 5:28 PM    Indication: Chest pain.    Comparison: 01/13/2020    Technique: 1 view of the chest    FINDINGS:  The heart size is within normal. There is borderline hyperinflation.  Mediastinal and hilar contours are unremarkable. There is no vascular  congestion, airspace opacity, or pulmonary nodule. Negative for pleural  effusion or pneumothorax. No bone abnormality is seen.    Impression  1. There is a question of emphysema.  2. No acute process demonstrated.    Electronically Signed By-Sandee Lee MD On:6/2/2021 5:59 PM  This report was finalized on [id number] by  Sandee Lee MD.               Lab Review:   No visits with results within 6 Month(s) from this visit.   Latest known visit with results is:   Admission on 06/02/2021, Discharged on 06/02/2021   Component Date Value    QT Interval 06/02/2021 370     Glucose 06/02/2021 94     BUN 06/02/2021 10     Creatinine 06/02/2021 0.84     Sodium 06/02/2021 139     Potassium 06/02/2021 3.6     Chloride 06/02/2021 99     CO2 06/02/2021 26.0     Calcium 06/02/2021 9.2     eGFR Non  Amer 06/02/2021 66     BUN/Creatinine Ratio 06/02/2021 11.9     Anion Gap 06/02/2021 14.0     Troponin T 06/02/2021 <0.010     WBC 06/02/2021 6.80     RBC 06/02/2021 4.51     Hemoglobin 06/02/2021 15.0     Hematocrit 06/02/2021 43.3     MCV 06/02/2021 96.2     MCH 06/02/2021 33.2 (H)     MCHC 06/02/2021 34.5     RDW 06/02/2021 13.2     RDW-SD 06/02/2021 43.8     MPV 06/02/2021 7.7     Platelets 06/02/2021 248     Neutrophil % 06/02/2021 58.5      Lymphocyte % 06/02/2021 33.5     Monocyte % 06/02/2021 6.9     Eosinophil % 06/02/2021 0.8     Basophil % 06/02/2021 0.3     Neutrophils, Absolute 06/02/2021 4.00     Lymphocytes, Absolute 06/02/2021 2.30     Monocytes, Absolute 06/02/2021 0.50     Eosinophils, Absolute 06/02/2021 0.10     Basophils, Absolute 06/02/2021 0.00     nRBC 06/02/2021 0.1      Recent labs reviewed and interpreted for clinical significance and application            Level of Care:           Jj Myers MD  03/12/24  .

## 2024-04-02 ENCOUNTER — OFFICE (OUTPATIENT)
Dept: URBAN - METROPOLITAN AREA INFUSION 3 | Facility: INFUSION | Age: 79
End: 2024-04-02

## 2024-04-02 VITALS
HEART RATE: 72 BPM | SYSTOLIC BLOOD PRESSURE: 97 MMHG | SYSTOLIC BLOOD PRESSURE: 103 MMHG | HEIGHT: 65 IN | WEIGHT: 116.8 LBS | RESPIRATION RATE: 17 BRPM | DIASTOLIC BLOOD PRESSURE: 67 MMHG | HEART RATE: 59 BPM | TEMPERATURE: 98.4 F | RESPIRATION RATE: 16 BRPM | HEART RATE: 61 BPM | DIASTOLIC BLOOD PRESSURE: 52 MMHG | TEMPERATURE: 98.3 F | SYSTOLIC BLOOD PRESSURE: 126 MMHG | DIASTOLIC BLOOD PRESSURE: 70 MMHG

## 2024-04-02 DIAGNOSIS — K51.50 LEFT SIDED COLITIS WITHOUT COMPLICATIONS: ICD-10-CM

## 2024-04-02 PROCEDURE — 96365 THER/PROPH/DIAG IV INF INIT: CPT | Performed by: INTERNAL MEDICINE

## 2024-04-02 NOTE — SERVICENOTES
NEGATIVE PPD or Quantiferon Gold: 9/2023
MEDS PROVIDED BY [facility]
cbc cmp crp obtained prior to infusion

## 2024-05-24 ENCOUNTER — HOSPITAL ENCOUNTER (OUTPATIENT)
Dept: CT IMAGING | Facility: HOSPITAL | Age: 79
Discharge: HOME | End: 2024-05-24
Admitting: INTERNAL MEDICINE

## 2024-05-24 DIAGNOSIS — Z00.00 PREVENTATIVE HEALTH CARE: ICD-10-CM

## 2024-05-24 DIAGNOSIS — I48.0 PAROXYSMAL ATRIAL FIBRILLATION: ICD-10-CM

## 2024-05-24 PROCEDURE — 75571 CT HRT W/O DYE W/CA TEST: CPT

## 2024-05-28 ENCOUNTER — OFFICE (OUTPATIENT)
Dept: URBAN - METROPOLITAN AREA INFUSION 3 | Facility: INFUSION | Age: 79
End: 2024-05-28

## 2024-05-28 VITALS
TEMPERATURE: 97.9 F | HEART RATE: 59 BPM | HEART RATE: 57 BPM | HEART RATE: 56 BPM | DIASTOLIC BLOOD PRESSURE: 53 MMHG | SYSTOLIC BLOOD PRESSURE: 108 MMHG | HEIGHT: 65 IN | RESPIRATION RATE: 16 BRPM | TEMPERATURE: 98 F | DIASTOLIC BLOOD PRESSURE: 56 MMHG | SYSTOLIC BLOOD PRESSURE: 114 MMHG

## 2024-05-28 DIAGNOSIS — K51.50 LEFT SIDED COLITIS WITHOUT COMPLICATIONS: ICD-10-CM

## 2024-05-28 PROCEDURE — 96365 THER/PROPH/DIAG IV INF INIT: CPT | Performed by: INTERNAL MEDICINE

## 2024-06-10 RX ORDER — METOPROLOL SUCCINATE 25 MG/1
25 TABLET, EXTENDED RELEASE ORAL DAILY
Qty: 90 TABLET | Refills: 3 | Status: SHIPPED | OUTPATIENT
Start: 2024-06-10

## 2024-07-23 ENCOUNTER — OFFICE (OUTPATIENT)
Dept: URBAN - METROPOLITAN AREA INFUSION 3 | Facility: INFUSION | Age: 79
End: 2024-07-23
Payer: COMMERCIAL

## 2024-07-23 VITALS
HEART RATE: 57 BPM | RESPIRATION RATE: 16 BRPM | WEIGHT: 114.6 LBS | DIASTOLIC BLOOD PRESSURE: 61 MMHG | SYSTOLIC BLOOD PRESSURE: 118 MMHG | TEMPERATURE: 98 F | HEART RATE: 56 BPM | TEMPERATURE: 98.2 F | DIASTOLIC BLOOD PRESSURE: 63 MMHG | HEIGHT: 65 IN | DIASTOLIC BLOOD PRESSURE: 54 MMHG

## 2024-07-23 DIAGNOSIS — K51.50 LEFT SIDED COLITIS WITHOUT COMPLICATIONS: ICD-10-CM

## 2024-07-23 PROCEDURE — 96365 THER/PROPH/DIAG IV INF INIT: CPT | Performed by: INTERNAL MEDICINE

## 2024-07-23 NOTE — SERVICENOTES
NEGATIVE PPD or Quantiferon Gold: 9/2023
MEDS PROVIDED BY [facility]
cbc cmp crp obtained prior to infusion

## 2024-08-02 ENCOUNTER — OFFICE (OUTPATIENT)
Dept: URBAN - METROPOLITAN AREA CLINIC 64 | Facility: CLINIC | Age: 79
End: 2024-08-02
Payer: MEDICARE

## 2024-08-02 VITALS
HEART RATE: 60 BPM | WEIGHT: 114 LBS | SYSTOLIC BLOOD PRESSURE: 106 MMHG | DIASTOLIC BLOOD PRESSURE: 68 MMHG | HEIGHT: 65 IN

## 2024-08-02 DIAGNOSIS — Z79.899 OTHER LONG TERM (CURRENT) DRUG THERAPY: ICD-10-CM

## 2024-08-02 DIAGNOSIS — K51.50 LEFT SIDED COLITIS WITHOUT COMPLICATIONS: ICD-10-CM

## 2024-08-02 PROCEDURE — 99214 OFFICE O/P EST MOD 30 MIN: CPT | Performed by: INTERNAL MEDICINE

## 2024-09-17 ENCOUNTER — OFFICE (OUTPATIENT)
Age: 79
End: 2024-09-17

## 2024-09-17 ENCOUNTER — OFFICE (OUTPATIENT)
Dept: URBAN - METROPOLITAN AREA INFUSION 3 | Facility: INFUSION | Age: 79
End: 2024-09-17

## 2024-09-17 VITALS
DIASTOLIC BLOOD PRESSURE: 51 MMHG | HEART RATE: 55 BPM | DIASTOLIC BLOOD PRESSURE: 45 MMHG | TEMPERATURE: 98 F | TEMPERATURE: 98.4 F | RESPIRATION RATE: 16 BRPM | HEART RATE: 61 BPM | HEART RATE: 57 BPM | SYSTOLIC BLOOD PRESSURE: 101 MMHG | HEART RATE: 61 BPM | TEMPERATURE: 98.4 F | TEMPERATURE: 98.4 F | TEMPERATURE: 98 F | SYSTOLIC BLOOD PRESSURE: 101 MMHG | HEIGHT: 65 IN | TEMPERATURE: 98.4 F | HEART RATE: 61 BPM | TEMPERATURE: 98 F | RESPIRATION RATE: 16 BRPM | HEART RATE: 55 BPM | DIASTOLIC BLOOD PRESSURE: 45 MMHG | DIASTOLIC BLOOD PRESSURE: 50 MMHG | RESPIRATION RATE: 17 BRPM | TEMPERATURE: 98 F | SYSTOLIC BLOOD PRESSURE: 101 MMHG | HEART RATE: 61 BPM | HEART RATE: 61 BPM | HEART RATE: 55 BPM | DIASTOLIC BLOOD PRESSURE: 45 MMHG | TEMPERATURE: 98.4 F | RESPIRATION RATE: 17 BRPM | SYSTOLIC BLOOD PRESSURE: 101 MMHG | HEART RATE: 61 BPM | DIASTOLIC BLOOD PRESSURE: 50 MMHG | WEIGHT: 113.6 LBS | DIASTOLIC BLOOD PRESSURE: 50 MMHG | HEIGHT: 65 IN | HEIGHT: 65 IN | DIASTOLIC BLOOD PRESSURE: 51 MMHG | HEART RATE: 55 BPM | SYSTOLIC BLOOD PRESSURE: 101 MMHG | HEART RATE: 55 BPM | DIASTOLIC BLOOD PRESSURE: 50 MMHG | SYSTOLIC BLOOD PRESSURE: 113 MMHG | HEART RATE: 57 BPM | SYSTOLIC BLOOD PRESSURE: 101 MMHG | RESPIRATION RATE: 17 BRPM | WEIGHT: 113.6 LBS | DIASTOLIC BLOOD PRESSURE: 45 MMHG | SYSTOLIC BLOOD PRESSURE: 113 MMHG | SYSTOLIC BLOOD PRESSURE: 113 MMHG | DIASTOLIC BLOOD PRESSURE: 45 MMHG | DIASTOLIC BLOOD PRESSURE: 50 MMHG | WEIGHT: 113.6 LBS | RESPIRATION RATE: 17 BRPM | RESPIRATION RATE: 16 BRPM | DIASTOLIC BLOOD PRESSURE: 51 MMHG | HEART RATE: 57 BPM | RESPIRATION RATE: 16 BRPM | SYSTOLIC BLOOD PRESSURE: 101 MMHG | HEART RATE: 55 BPM | RESPIRATION RATE: 16 BRPM | HEIGHT: 65 IN | HEIGHT: 65 IN | HEART RATE: 55 BPM | SYSTOLIC BLOOD PRESSURE: 113 MMHG | HEART RATE: 61 BPM | WEIGHT: 113.6 LBS | WEIGHT: 113.6 LBS | TEMPERATURE: 98.4 F | DIASTOLIC BLOOD PRESSURE: 51 MMHG | DIASTOLIC BLOOD PRESSURE: 50 MMHG | HEART RATE: 57 BPM | WEIGHT: 113.6 LBS | DIASTOLIC BLOOD PRESSURE: 51 MMHG | SYSTOLIC BLOOD PRESSURE: 113 MMHG | WEIGHT: 113.6 LBS | DIASTOLIC BLOOD PRESSURE: 45 MMHG | DIASTOLIC BLOOD PRESSURE: 51 MMHG | RESPIRATION RATE: 17 BRPM | RESPIRATION RATE: 16 BRPM | TEMPERATURE: 98.4 F | RESPIRATION RATE: 16 BRPM | HEART RATE: 57 BPM | HEART RATE: 57 BPM | HEIGHT: 65 IN | SYSTOLIC BLOOD PRESSURE: 113 MMHG | TEMPERATURE: 98 F | HEART RATE: 57 BPM | RESPIRATION RATE: 17 BRPM | DIASTOLIC BLOOD PRESSURE: 50 MMHG | HEIGHT: 65 IN | TEMPERATURE: 98 F | SYSTOLIC BLOOD PRESSURE: 113 MMHG | DIASTOLIC BLOOD PRESSURE: 51 MMHG | TEMPERATURE: 98 F | DIASTOLIC BLOOD PRESSURE: 45 MMHG | RESPIRATION RATE: 17 BRPM

## 2024-09-17 DIAGNOSIS — K51.50 LEFT SIDED COLITIS WITHOUT COMPLICATIONS: ICD-10-CM

## 2024-09-17 PROCEDURE — 96365 THER/PROPH/DIAG IV INF INIT: CPT | Performed by: INTERNAL MEDICINE

## 2024-09-17 NOTE — SERVICENOTES
NEGATIVE PPD or Quantiferon Gold: 9/2023
MEDS PROVIDED BY [facility]
HepB and Quantiferon Gold obtained prior to infusion

## 2024-11-12 ENCOUNTER — OFFICE (OUTPATIENT)
Age: 79
End: 2024-11-12
Payer: COMMERCIAL

## 2024-11-12 ENCOUNTER — OFFICE (OUTPATIENT)
Dept: URBAN - METROPOLITAN AREA INFUSION 3 | Facility: INFUSION | Age: 79
End: 2024-11-12
Payer: COMMERCIAL

## 2024-11-12 VITALS
HEIGHT: 65 IN | DIASTOLIC BLOOD PRESSURE: 59 MMHG | SYSTOLIC BLOOD PRESSURE: 104 MMHG | SYSTOLIC BLOOD PRESSURE: 104 MMHG | DIASTOLIC BLOOD PRESSURE: 56 MMHG | DIASTOLIC BLOOD PRESSURE: 56 MMHG | SYSTOLIC BLOOD PRESSURE: 106 MMHG | HEART RATE: 60 BPM | HEART RATE: 57 BPM | SYSTOLIC BLOOD PRESSURE: 101 MMHG | RESPIRATION RATE: 18 BRPM | SYSTOLIC BLOOD PRESSURE: 104 MMHG | HEART RATE: 66 BPM | HEART RATE: 57 BPM | SYSTOLIC BLOOD PRESSURE: 101 MMHG | TEMPERATURE: 97.6 F | TEMPERATURE: 97.6 F | SYSTOLIC BLOOD PRESSURE: 101 MMHG | TEMPERATURE: 97.6 F | HEIGHT: 65 IN | DIASTOLIC BLOOD PRESSURE: 56 MMHG | HEART RATE: 60 BPM | DIASTOLIC BLOOD PRESSURE: 59 MMHG | RESPIRATION RATE: 18 BRPM | HEART RATE: 60 BPM | HEART RATE: 60 BPM | HEART RATE: 66 BPM | HEIGHT: 65 IN | HEART RATE: 66 BPM | HEART RATE: 57 BPM | TEMPERATURE: 98.2 F | DIASTOLIC BLOOD PRESSURE: 59 MMHG | SYSTOLIC BLOOD PRESSURE: 104 MMHG | SYSTOLIC BLOOD PRESSURE: 104 MMHG | HEIGHT: 65 IN | TEMPERATURE: 98.2 F | TEMPERATURE: 98.2 F | HEART RATE: 66 BPM | HEART RATE: 57 BPM | DIASTOLIC BLOOD PRESSURE: 56 MMHG | SYSTOLIC BLOOD PRESSURE: 106 MMHG | HEART RATE: 66 BPM | HEART RATE: 57 BPM | SYSTOLIC BLOOD PRESSURE: 101 MMHG | DIASTOLIC BLOOD PRESSURE: 59 MMHG | TEMPERATURE: 98.2 F | SYSTOLIC BLOOD PRESSURE: 104 MMHG | HEART RATE: 60 BPM | DIASTOLIC BLOOD PRESSURE: 54 MMHG | DIASTOLIC BLOOD PRESSURE: 54 MMHG | RESPIRATION RATE: 18 BRPM | DIASTOLIC BLOOD PRESSURE: 54 MMHG | TEMPERATURE: 97.6 F | SYSTOLIC BLOOD PRESSURE: 104 MMHG | HEART RATE: 60 BPM | HEART RATE: 66 BPM | HEART RATE: 60 BPM | SYSTOLIC BLOOD PRESSURE: 106 MMHG | RESPIRATION RATE: 18 BRPM | RESPIRATION RATE: 18 BRPM | DIASTOLIC BLOOD PRESSURE: 59 MMHG | DIASTOLIC BLOOD PRESSURE: 56 MMHG | HEIGHT: 65 IN | SYSTOLIC BLOOD PRESSURE: 101 MMHG | TEMPERATURE: 97.6 F | SYSTOLIC BLOOD PRESSURE: 101 MMHG | DIASTOLIC BLOOD PRESSURE: 54 MMHG | DIASTOLIC BLOOD PRESSURE: 56 MMHG | HEART RATE: 57 BPM | HEIGHT: 65 IN | DIASTOLIC BLOOD PRESSURE: 56 MMHG | DIASTOLIC BLOOD PRESSURE: 54 MMHG | SYSTOLIC BLOOD PRESSURE: 106 MMHG | HEIGHT: 65 IN | TEMPERATURE: 98.2 F | SYSTOLIC BLOOD PRESSURE: 101 MMHG | DIASTOLIC BLOOD PRESSURE: 54 MMHG | RESPIRATION RATE: 18 BRPM | HEART RATE: 57 BPM | DIASTOLIC BLOOD PRESSURE: 59 MMHG | SYSTOLIC BLOOD PRESSURE: 106 MMHG | DIASTOLIC BLOOD PRESSURE: 54 MMHG | TEMPERATURE: 98.2 F | RESPIRATION RATE: 18 BRPM | DIASTOLIC BLOOD PRESSURE: 59 MMHG | TEMPERATURE: 97.6 F | SYSTOLIC BLOOD PRESSURE: 106 MMHG | TEMPERATURE: 97.6 F | SYSTOLIC BLOOD PRESSURE: 106 MMHG | TEMPERATURE: 98.2 F | HEART RATE: 66 BPM

## 2024-11-12 DIAGNOSIS — K51.50 LEFT SIDED COLITIS WITHOUT COMPLICATIONS: ICD-10-CM

## 2024-11-12 PROCEDURE — 96365 THER/PROPH/DIAG IV INF INIT: CPT | Performed by: INTERNAL MEDICINE

## 2024-11-12 NOTE — SERVICENOTES
MEDS PROVIDED BY [facility]
NEGATIVE PPD or Quantiferon Gold:9/2024
CBC,CMP,CRP obtained today prior to infusion.

## 2024-11-12 NOTE — SERVICENOTES
MEDS PROVIDED BY [facility]
NEGATIVE PPD or Quantiferon Gold:9/2024
CBC,CMP,CRP obtained today prior to infusion.

## 2024-11-12 NOTE — SERVICENOTES
MEDS PROVIDED BY [facility]
NEGATIVE PPD or Quantiferon Gold:9/2024
CBC,CMP,CRP obtained today prior to infusion.

## 2024-11-12 NOTE — SERVICENOTES
MEDS PROVIDED BY [facility]
NEGATIVE PPD or Quantiferon Gold:9/2024
CBC,CMP,CRP obtained today prior to infusion.

## 2024-11-12 NOTE — SERVICENOTES
MEDS PROVIDED BY [facility]
NEGATIVE PPD or Quantiferon Gold:9/2024
CBC,CMP,CRP obtained today prior to infusion.

## 2024-11-12 NOTE — SERVICENOTES
MEDS PROVIDED BY [facility]
NEGATIVE PPD or Quantiferon Gold:9/2024
CBC,CMP,CRP obtained today prior to infusion.

## 2024-11-12 NOTE — SERVICENOTES
MEDS PROVIDED BY [facility]
NEGATIVE PPD or Quantiferon Gold:9/2024
CBC,CMP,CRP obtained today prior to infusion.

## 2025-01-09 ENCOUNTER — OFFICE (OUTPATIENT)
Dept: URBAN - METROPOLITAN AREA INFUSION 3 | Facility: INFUSION | Age: 80
End: 2025-01-09
Payer: MEDICARE

## 2025-01-09 VITALS
DIASTOLIC BLOOD PRESSURE: 58 MMHG | HEIGHT: 65 IN | SYSTOLIC BLOOD PRESSURE: 131 MMHG | SYSTOLIC BLOOD PRESSURE: 118 MMHG | WEIGHT: 126.8 LBS | TEMPERATURE: 97.7 F | RESPIRATION RATE: 16 BRPM | RESPIRATION RATE: 17 BRPM | HEART RATE: 58 BPM | DIASTOLIC BLOOD PRESSURE: 65 MMHG | DIASTOLIC BLOOD PRESSURE: 62 MMHG | SYSTOLIC BLOOD PRESSURE: 115 MMHG | TEMPERATURE: 98 F | HEART RATE: 61 BPM | HEART RATE: 57 BPM

## 2025-01-09 DIAGNOSIS — K51.50 LEFT SIDED COLITIS WITHOUT COMPLICATIONS: ICD-10-CM

## 2025-01-09 PROCEDURE — 96365 THER/PROPH/DIAG IV INF INIT: CPT | Performed by: INTERNAL MEDICINE

## 2025-03-06 ENCOUNTER — OFFICE (OUTPATIENT)
Dept: URBAN - METROPOLITAN AREA INFUSION 3 | Facility: INFUSION | Age: 80
End: 2025-03-06
Payer: MEDICARE

## 2025-03-06 VITALS
HEART RATE: 61 BPM | DIASTOLIC BLOOD PRESSURE: 69 MMHG | WEIGHT: 124 LBS | TEMPERATURE: 98 F | RESPIRATION RATE: 16 BRPM | DIASTOLIC BLOOD PRESSURE: 59 MMHG | SYSTOLIC BLOOD PRESSURE: 131 MMHG | HEIGHT: 65 IN | HEART RATE: 59 BPM | HEART RATE: 58 BPM | SYSTOLIC BLOOD PRESSURE: 136 MMHG | TEMPERATURE: 97.9 F | DIASTOLIC BLOOD PRESSURE: 65 MMHG

## 2025-03-06 DIAGNOSIS — K51.50 LEFT SIDED COLITIS WITHOUT COMPLICATIONS: ICD-10-CM

## 2025-03-06 PROCEDURE — 96365 THER/PROPH/DIAG IV INF INIT: CPT | Performed by: INTERNAL MEDICINE

## 2025-03-06 NOTE — SERVICENOTES
NEGATIVE PPD or Quantiferon Gold: 9/2024
MEDS PROVIDED BY [facility]
cbc cmp crp obtained prior to infusion 
Patient had swelling around (r) elbow down to mid forearm, She stated she had been working out in the yard and thinks something may have bitten her. Area not painful to touch. Apryl assessed and we marked the area and advised the patient to make sure it doesnt continue to spread. If site worsens advised to call back and a scan could be ordered at that time. Antibiotic order by Apryl at visit today.

## 2025-03-25 ENCOUNTER — OFFICE VISIT (OUTPATIENT)
Dept: CARDIOLOGY | Facility: CLINIC | Age: 80
End: 2025-03-25
Payer: MEDICARE

## 2025-03-25 VITALS
SYSTOLIC BLOOD PRESSURE: 115 MMHG | WEIGHT: 123 LBS | HEIGHT: 66 IN | DIASTOLIC BLOOD PRESSURE: 76 MMHG | HEART RATE: 62 BPM | RESPIRATION RATE: 16 BRPM | BODY MASS INDEX: 19.77 KG/M2 | OXYGEN SATURATION: 99 %

## 2025-03-25 DIAGNOSIS — E78.5 DYSLIPIDEMIA: ICD-10-CM

## 2025-03-25 DIAGNOSIS — R00.2 PALPITATIONS: Primary | ICD-10-CM

## 2025-03-25 DIAGNOSIS — I47.10 PAROXYSMAL SVT (SUPRAVENTRICULAR TACHYCARDIA): ICD-10-CM

## 2025-03-25 PROCEDURE — 99213 OFFICE O/P EST LOW 20 MIN: CPT | Performed by: NURSE PRACTITIONER

## 2025-03-25 PROCEDURE — 93000 ELECTROCARDIOGRAM COMPLETE: CPT | Performed by: NURSE PRACTITIONER

## 2025-03-25 RX ORDER — RIVASTIGMINE 9.5 MG/24H
1 PATCH, EXTENDED RELEASE TRANSDERMAL DAILY
COMMUNITY
Start: 2024-10-31 | End: 2025-10-31

## 2025-03-25 RX ORDER — FOLIC ACID 20 MG
CAPSULE ORAL
COMMUNITY

## 2025-03-25 RX ORDER — ATORVASTATIN CALCIUM 10 MG/1
10 TABLET, FILM COATED ORAL DAILY
COMMUNITY
Start: 2025-02-06

## 2025-03-25 NOTE — PROGRESS NOTES
Cardiology Office Follow Up Visit      Primary Care Provider:  Christoph Miller MD    Reason for f/u:     Palpitation  PSVT  Elevated Coronary calcium score      Subjective     CC:    Denies  Chest pain or dyspnea    History of Present Illness       Mary Kay Montero is a 79 y.o. female. Patient is a very pleasant female who has a past medical history of palpitations, PSVT      Back in January 2020 the patient presented to [facility] with feelings of palpitations and shortness of breath.  Heart rate was up to 160 per her report.     She underwent echocardiogram and stress Myoview.  These tests were reviewed.  Stress Myoview in January 2020 showed no reversible ischemia.  Echocardiogram from January 2020 was essentially normal ejection fraction was normal and there were no significant valvular flow abnormalities.     The patient was started on metoprolol and has done well.In May 2024 patient had a CT calcium score ordered.  Left main was 0, LAD 13.2, left circumflex 0, RCA 5.4 total score 18.6 putting her in a category mild plaque burden.  Do not have access to a recent lipid panel in epic.    Patient is here today with her .  She reports she has anxiety regarding her recent memory loss issues.  She has been started on Zoloft.    She was recently started on a statin Lipitor 10 mg daily by her PCP.    She denies any current or recent chest pain, dyspnea, PND, orthopnea, palpitations, near syncope, lower extremity edema and feelings of her heart racing.               ASSESSMENT/PLAN:      Diagnoses and all orders for this visit:    1. Palpitations (Primary)    2. Paroxysmal SVT (supraventricular tachycardia)    3. Dyslipidemia    Other orders  -     ECG 12 Lead            MEDICAL DECISION MAKING:    Patient is not having any symptoms to suggest angina.  No recurrent feelings of palpitations or heart racing.  She is tolerating current medical therapy.  She has been undergoing evaluation by neurology due to  progressive memory loss.  She reports feeling anxious about this.  She has been started on Zoloft.    EKG does not show any acute ST or T wave segment abnormalities.  No ectopic beats.  Was recently started on statin due to dyslipidemia.  With plans to have lipids assessed by her PCP.    I made no changes in her medication.  Will continue the current treatment.  If she develops any new problems of asked her to contact the office sooner otherwise she will have scheduled follow-up with Dr. Myers in 1 year.          Past Medical History:   Diagnosis Date    Hyperlipidemia SEVERAL YEARS AGO    ON MEDICATION    Hypertension     SVT (supraventricular tachycardia)        History reviewed. No pertinent surgical history.      Current Outpatient Medications:     atorvastatin (LIPITOR) 10 MG tablet, Take 1 tablet by mouth Daily., Disp: , Rfl:     Folic Acid 20 MG capsule, 0, Disp: , Rfl:     metoprolol succinate XL (TOPROL-XL) 25 MG 24 hr tablet, TAKE ONE TABLET BY MOUTH DAILY, Disp: 90 tablet, Rfl: 3    rivastigmine (EXELON) 9.5 MG/24HR patch, Place 1 patch on the skin as directed by provider Daily., Disp: , Rfl:     sertraline (ZOLOFT) 25 MG tablet, Take 1 tablet by mouth Daily., Disp: , Rfl:     Vedolizumab (ENTYVIO IV), Infuse  into a venous catheter. Every 8 weeks, Disp: , Rfl:     vitamin D3 125 MCG (5000 UT) capsule capsule, Take 1 capsule by mouth Daily., Disp: , Rfl:     Social History     Socioeconomic History    Marital status:    Tobacco Use    Smoking status: Never     Passive exposure: Never    Smokeless tobacco: Never   Vaping Use    Vaping status: Never Used   Substance and Sexual Activity    Alcohol use: Yes     Alcohol/week: 7.0 standard drinks of alcohol     Types: 7 Shots of liquor per week    Drug use: Never    Sexual activity: Defer       Family History   Problem Relation Age of Onset    No Known Problems Mother     No Known Problems Father        The following portions of the patient's history  "were reviewed and updated as appropriate: allergies, current medications, past family history, past medical history, past social history, past surgical history and problem list.    Review of Systems   Psychiatric/Behavioral:  Positive for memory loss. The patient is nervous/anxious.    All other systems reviewed and are negative.      Pertinent items are noted in HPI, all other systems reviewed and negative    /76 (BP Location: Right arm, Patient Position: Sitting, Cuff Size: Large Adult)   Pulse 62   Resp 16   Ht 167.6 cm (66\")   Wt 55.8 kg (123 lb)   SpO2 99%   BMI 19.85 kg/m² .  Objective     Vitals reviewed.   Constitutional:       General: Not in acute distress.     Appearance: Normal appearance. Well-developed.   Eyes:      Pupils: Pupils are equal, round, and reactive to light.   HENT:      Head: Normocephalic and atraumatic.   Neck:      Vascular: No JVD.   Pulmonary:      Effort: Pulmonary effort is normal.      Breath sounds: Normal breath sounds.   Cardiovascular:      Normal rate. Regular rhythm.   Edema:     Peripheral edema absent.   Abdominal:      General: There is no distension.      Palpations: Abdomen is soft.      Tenderness: There is no abdominal tenderness.   Musculoskeletal: Normal range of motion.      Cervical back: Normal range of motion and neck supple. Skin:     General: Skin is warm and dry.   Neurological:      Mental Status: Alert and oriented to person, place, and time.             ECG 12 Lead    Date/Time: 3/25/2025 11:32 AM  Performed by: Dianne Gonzalez APRN    Authorized by: Dianne Gonzalez APRN  Comparison: compared with previous ECG   Rhythm: sinus rhythm  Rate: normal  BPM: 61  Conduction: conduction normal  QRS axis: normal  Other findings: low voltage and left atrial abnormality    Clinical impression: abnormal EKG          EKG ordered by and reviewed by me in office                    "

## 2025-05-12 RX ORDER — METOPROLOL SUCCINATE 25 MG/1
25 TABLET, EXTENDED RELEASE ORAL DAILY
Qty: 90 TABLET | Refills: 3 | Status: SHIPPED | OUTPATIENT
Start: 2025-05-12

## 2025-06-26 ENCOUNTER — OFFICE (OUTPATIENT)
Dept: URBAN - METROPOLITAN AREA INFUSION 3 | Facility: INFUSION | Age: 80
End: 2025-06-26
Payer: MEDICARE

## 2025-06-26 VITALS
DIASTOLIC BLOOD PRESSURE: 70 MMHG | HEART RATE: 62 BPM | HEIGHT: 65 IN | TEMPERATURE: 97.7 F | DIASTOLIC BLOOD PRESSURE: 66 MMHG | DIASTOLIC BLOOD PRESSURE: 73 MMHG | RESPIRATION RATE: 16 BRPM | HEART RATE: 66 BPM | SYSTOLIC BLOOD PRESSURE: 127 MMHG | SYSTOLIC BLOOD PRESSURE: 123 MMHG | TEMPERATURE: 97.9 F | SYSTOLIC BLOOD PRESSURE: 118 MMHG | HEART RATE: 63 BPM | WEIGHT: 123.8 LBS

## 2025-06-26 DIAGNOSIS — K51.50 LEFT SIDED COLITIS WITHOUT COMPLICATIONS: ICD-10-CM

## 2025-06-26 PROCEDURE — 96365 THER/PROPH/DIAG IV INF INIT: CPT | Performed by: INTERNAL MEDICINE

## 2025-06-26 NOTE — SERVICENOTES
NEGATIVE PPD or Quantiferon Gold: 9/2024
MEDS PROVIDED BY [facility]
cbc cmp crp obtained prior to infusion

## 2025-08-21 ENCOUNTER — OFFICE (OUTPATIENT)
Dept: URBAN - METROPOLITAN AREA INFUSION 3 | Facility: INFUSION | Age: 80
End: 2025-08-21
Payer: MEDICARE

## 2025-08-21 DIAGNOSIS — K51.50 LEFT SIDED COLITIS WITHOUT COMPLICATIONS: ICD-10-CM

## 2025-08-21 PROCEDURE — 96365 THER/PROPH/DIAG IV INF INIT: CPT | Performed by: INTERNAL MEDICINE

## 2025-09-04 ENCOUNTER — OFFICE (OUTPATIENT)
Dept: URBAN - METROPOLITAN AREA CLINIC 64 | Facility: CLINIC | Age: 80
End: 2025-09-04
Payer: MEDICARE

## 2025-09-04 VITALS
WEIGHT: 122 LBS | DIASTOLIC BLOOD PRESSURE: 53 MMHG | HEIGHT: 65 IN | HEART RATE: 58 BPM | SYSTOLIC BLOOD PRESSURE: 100 MMHG

## 2025-09-04 DIAGNOSIS — F03.90 UNSPECIFIED DEMENTIA, UNSPECIFIED SEVERITY, WITHOUT BEHAVIOR: ICD-10-CM

## 2025-09-04 DIAGNOSIS — Z79.899 OTHER LONG TERM (CURRENT) DRUG THERAPY: ICD-10-CM

## 2025-09-04 DIAGNOSIS — K51.50 LEFT SIDED COLITIS WITHOUT COMPLICATIONS: ICD-10-CM

## 2025-09-04 PROCEDURE — 99214 OFFICE O/P EST MOD 30 MIN: CPT | Performed by: INTERNAL MEDICINE
